# Patient Record
Sex: MALE | NOT HISPANIC OR LATINO | Employment: OTHER | URBAN - METROPOLITAN AREA
[De-identification: names, ages, dates, MRNs, and addresses within clinical notes are randomized per-mention and may not be internally consistent; named-entity substitution may affect disease eponyms.]

---

## 2022-05-25 ENCOUNTER — OFFICE VISIT (OUTPATIENT)
Dept: CARDIOLOGY | Facility: CLINIC | Age: 77
End: 2022-05-25

## 2022-05-25 VITALS
DIASTOLIC BLOOD PRESSURE: 82 MMHG | SYSTOLIC BLOOD PRESSURE: 124 MMHG | BODY MASS INDEX: 28.56 KG/M2 | WEIGHT: 204 LBS | HEIGHT: 71 IN | HEART RATE: 60 BPM | OXYGEN SATURATION: 96 %

## 2022-05-25 DIAGNOSIS — I49.5 SSS (SICK SINUS SYNDROME): ICD-10-CM

## 2022-05-25 DIAGNOSIS — I25.10 CORONARY ARTERY DISEASE INVOLVING NATIVE CORONARY ARTERY OF NATIVE HEART WITHOUT ANGINA PECTORIS: Primary | ICD-10-CM

## 2022-05-25 PROCEDURE — 99204 OFFICE O/P NEW MOD 45 MIN: CPT | Performed by: INTERNAL MEDICINE

## 2022-05-25 PROCEDURE — 93280 PM DEVICE PROGR EVAL DUAL: CPT | Performed by: INTERNAL MEDICINE

## 2022-05-25 PROCEDURE — 93000 ELECTROCARDIOGRAM COMPLETE: CPT | Performed by: INTERNAL MEDICINE

## 2022-05-25 RX ORDER — NITROGLYCERIN 0.4 MG/1
0.4 TABLET SUBLINGUAL
Qty: 25 TABLET | Refills: 3 | Status: SHIPPED | OUTPATIENT
Start: 2022-05-25

## 2022-05-25 RX ORDER — ASPIRIN 81 MG/1
81 TABLET, CHEWABLE ORAL DAILY
COMMUNITY

## 2022-05-25 RX ORDER — NITROGLYCERIN 0.4 MG/1
0.4 TABLET SUBLINGUAL
COMMUNITY
End: 2022-05-25 | Stop reason: SDUPTHER

## 2022-05-25 NOTE — PROGRESS NOTES
[facility] Cardiology  Consultation H&P  Lizandro Culp  1945  [phone]  There is no work phone number on file..    VISIT DATE:  05/25/2022    PCP: Luis Loya  [address] [place] [address]    CC:  Chief Complaint   Patient presents with   • Coronary Artery Disease       Previous cardiac studies and procedures:  October 3 2002: Cardiac catheterization: RCA PCI/stenting  October 4, 2002 LAD stent.  October 15 2002 left circumflex stent    March 2017 LAD stent, left circumflex stent, RCA stent.    April 2017: Stent RCA.    August 2019   Saint Thong dual-chamber pacemaker  Myocardial perfusion imaging: EF 55%, mild inferolateral ischemia.    July 2020 TTE: EF 60%, mild diastolic dysfunction, mitral valve prolapse, posterior leaflet, mild MR.    ASSESSMENT:   Diagnosis Plan   1. Coronary artery disease involving native coronary artery of native heart without angina pectoris     2. SSS (sick sinus syndrome) ([facility])       Device interrogation:  Saint Thong dual-chamber pacemaker  37% atrial pacing, sensed P wave greater than 5 mV, threshold 1 V at 0.4 ms, impedance 4 and 60 ohms  2.8% RV pacing, sensed R wave 10 mV, threshold 2 V at 0.4 ms, impedance 350 ohms  Estimated battery life 9.2 to 11.2 years  Rare atrial lead noise which is responsible for mode switching  1 high ventricular heart rate, brief 2-second episode of SVT.    PLAN:  Coronary artery disease: Currently stable and asymptomatic.  Continue aspirin and heart healthy diet.    Sick sinus syndrome: Continue routine follow-up in device clinic.    Statin intolerant.  Hesitant to try alternative agents.    History of Present Illness   76-year-old gentleman with a history of coronary artery disease requiring recurrent multivessel PCI.  Has been stable recently.  Has stable shortness of breath in a class II pattern.  Denies chest discomfort.  Blood pressures running less than 130/80 mmHg.  Has not required  "any sublingual nitroglycerin recently.  Taking aspirin on a regular basis.  Developed flulike symptoms on multiple statins agents.  Discussed potential options such as PCSK9 inhibitors, he is not interested in trying an alternative agent at this time.  Reviewed most recent laboratory evaluation.    PHYSICAL EXAMINATION:  Vitals:    05/25/22 1025   BP: 124/82   BP Location: Right arm   Patient Position: Sitting   Pulse: 60   SpO2: 96%   Weight: 92.5 kg (204 lb)   Height: 180.3 cm (71\")     General Appearance:    Alert, cooperative, no distress, appears stated age   Head:    Normocephalic, without obvious abnormality, atraumatic   Eyes:    conjunctiva/corneas clear, EOM's intact, fundi     benign, both eyes   Ears:    Normal TM's and external ear canals, both ears   Nose:   Nares normal, septum midline, mucosa normal, no drainage    or sinus tenderness   Throat:   Lips, mucosa, and tongue normal; teeth and gums normal   Neck:   Supple, symmetrical, trachea midline, no adenopathy;     thyroid:  no enlargement/tenderness/nodules; no carotid    bruit or JVD   Back:     Symmetric, no curvature, ROM normal, no CVA tenderness   Lungs:     Clear to auscultation bilaterally, respirations unlabored   Chest Wall:    No tenderness or deformity    Heart:    Regular rate and rhythm, S1 and S2 normal, no murmur, rub   or gallop, normal carotid impulse bilaterally without bruit.   Abdomen:     Soft, non-tender, bowel sounds active all four quadrants,     no masses, no organomegaly   Extremities:   Extremities normal, atraumatic, no cyanosis or edema   Pulses:   2+ and symmetric all extremities   Skin:   Skin color, texture, turgor normal, no rashes or lesions   Lymph nodes:   Cervical, supraclavicular, and axillary nodes normal   Neurologic:   normal strength, sensation intact     throughout       Diagnostic Data:    ECG 12 Lead    Date/Time: 5/25/2022 10:34 AM  Performed by: Ren Sneed III, MD  Authorized by: Ren Sneed III, " MD   Previous ECG: no previous ECG available  Rhythm: sinus rhythm  Rhythm comments: Atrial demand pacing  Other findings: T wave abnormality    Clinical impression: abnormal EKG          No results found for: CHLPL, TRIG, HDL, LDLDIRECT  No results found for: GLUCOSE, BUN, CREATININE, NA, K, CL, CO2, CREATININE, BUN  No results found for: HGBA1C  No results found for: WBC, HGB, HCT, PLT    PROBLEM LIST:  There is no problem list on file for this patient.      PAST MEDICAL HX  Past Medical History:   Diagnosis Date   • Thyroid disease        Allergies  No Known Allergies    Current Medications    Current Outpatient Medications:   •  nitroglycerin (NITROSTAT) 0.4 MG SL tablet, Place 0.4 mg under the tongue Every 5 (Five) Minutes As Needed for Chest Pain. Take no more than 3 doses in 15 minutes., Disp: , Rfl:          ROS  ROS      SOCIAL HX  Social History     Socioeconomic History   • Marital status:    Tobacco Use   • Smoking status: Former Smoker     Years: 25.00     Types: Cigarettes   Substance and Sexual Activity   • Alcohol use: Yes   • Drug use: Never   • Sexual activity: Defer       FAMILY HX  History reviewed. No pertinent family history.          Ren Sneed III, MD, FACC

## 2022-06-09 ENCOUNTER — OFFICE VISIT (OUTPATIENT)
Dept: GASTROENTEROLOGY | Facility: CLINIC | Age: 77
End: 2022-06-09

## 2022-06-09 VITALS
HEIGHT: 71 IN | DIASTOLIC BLOOD PRESSURE: 77 MMHG | TEMPERATURE: 98 F | BODY MASS INDEX: 28.98 KG/M2 | HEART RATE: 64 BPM | SYSTOLIC BLOOD PRESSURE: 125 MMHG | WEIGHT: 207 LBS | RESPIRATION RATE: 24 BRPM

## 2022-06-09 DIAGNOSIS — Z12.11 ENCOUNTER FOR SCREENING FOR MALIGNANT NEOPLASM OF COLON: Primary | ICD-10-CM

## 2022-06-09 DIAGNOSIS — Z01.812 PRE-PROCEDURE LAB EXAM: Primary | ICD-10-CM

## 2022-06-09 DIAGNOSIS — K21.9 GASTROESOPHAGEAL REFLUX DISEASE WITHOUT ESOPHAGITIS: ICD-10-CM

## 2022-06-09 PROCEDURE — 99203 OFFICE O/P NEW LOW 30 MIN: CPT | Performed by: INTERNAL MEDICINE

## 2022-06-09 RX ORDER — ZINC OXIDE 13 %
CREAM (GRAM) TOPICAL
COMMUNITY

## 2022-06-09 RX ORDER — POLYETHYLENE GLYCOL 3350 17 G/17G
238 POWDER, FOR SOLUTION ORAL ONCE
Qty: 238 G | Refills: 0 | Status: SHIPPED | OUTPATIENT
Start: 2022-06-09 | End: 2022-06-09

## 2022-06-09 RX ORDER — BISACODYL 5 MG/1
20 TABLET, DELAYED RELEASE ORAL ONCE
Qty: 4 TABLET | Refills: 0 | Status: SHIPPED | OUTPATIENT
Start: 2022-06-09 | End: 2022-06-09

## 2022-06-09 RX ORDER — SODIUM CHLORIDE 9 MG/ML
70 INJECTION, SOLUTION INTRAVENOUS CONTINUOUS PRN
Status: CANCELLED | OUTPATIENT
Start: 2022-06-09

## 2022-06-09 NOTE — PROGRESS NOTES
New Patient Consult      Date: 2022   Patient Name: Lizandro Culp  MRN: [id number]  : 1945     Referring Physician: Luis Loya*    Chief Complaint   Patient presents with   • Colon Cancer Screening       History of Present Illness: Lizandro Culp is a 76 y.o. male who is here today to establish care with Gastroenterology for evalauation for colon cancer screening and reflux disease..   His family moved from [place] to [place] recently and is establishing GI care here.    Patient deny any abdominal pain, change in bowel habit, hematochezia or melena.  Weight is stable. Pt denies nausea vomiting or odynophagia or dysphagia. There is a longstanding history of acid reflux over 30 years and has been on Nexium over ten yrs but now he is on on digestive enzymes as advised by his nutritionist with no reflux symptoms. There is no history of anemia. No Prior history of EGD over 30 yrs ago. No details known except had hiatal hernia and was told he had scar tissues. Last colonoscopy over ten years ago, details not known. No family history of colon cancer or any GI malignancy. No history of any abdominal surgery. Denies cigarette smoking.  He is a former smoker.  Smoked for over 25 years. Drinks alcohol socially.    History of carotid disease and had a stent placed in .  He also had a pacemaker inserted and was removed in .  On ASA 81 daily  Blood work done in 2022 reviewed,  CBC and CMP were normal.    Subjective      Past Medical History:   Past Medical History:   Diagnosis Date   • Heart attack (HCC)    • Hiatal hernia    • Snores    • Thyroid disease        Past Surgical History:   Past Surgical History:   Procedure Laterality Date   • BACK SURGERY      fusion/deiscectomy   • COLONOSCOPY     • CORONARY ANGIOPLASTY        6 stents   • CORONARY ANGIOPLASTY WITH STENT PLACEMENT  2017    x 3 stent   • INGUINAL HERNIA REPAIR Bilateral    • INSERT / REPLACE / REMOVE PACEMAKER          Family History:   Family History   Problem Relation Age of Onset   • Colon cancer Neg Hx        Social History:   Social History     Socioeconomic History   • Marital status:    Tobacco Use   • Smoking status: Former Smoker     Years: 25.00     Types: Cigarettes     Quit date:      Years since quittin.4   • Smokeless tobacco: Never Used   Vaping Use   • Vaping Use: Never used   Substance and Sexual Activity   • Alcohol use: Yes     Comment: social   • Drug use: Never   • Sexual activity: Defer         Current Outpatient Medications:   •  Apoaequorin (PREVAGEN PO), Take  by mouth., Disp: , Rfl:   •  Ascorbic Acid (VITAMIN C PO), Take  by mouth., Disp: , Rfl:   •  aspirin 81 MG chewable tablet, Chew 81 mg Daily., Disp: , Rfl:   •  Cholecalciferol (VITAMIN D3 PO), Take  by mouth., Disp: , Rfl:   •  DIGESTIVE ENZYMES PO, Take  by mouth., Disp: , Rfl:   •  Misc Natural Products (PROSTATE SUPPORT PO), Take  by mouth. Super Beta Prostate, Disp: , Rfl:   •  nitroglycerin (NITROSTAT) 0.4 MG SL tablet, Place 1 tablet under the tongue Every 5 (Five) Minutes As Needed for Chest Pain. Take no more than 3 doses in 15 minutes., Disp: 25 tablet, Rfl: 3  •  Probiotic Product (Probiotic Daily) capsule, Take  by mouth., Disp: , Rfl:   •  Saw Palmetto, Serenoa repens, (SAW PALMETTO PO), Take  by mouth., Disp: , Rfl:     Allergies   Allergen Reactions   • Oatmeal Other (See Comments)     Foggy feeling, swollen glands, myalgia       Review of Systems:   Review of Systems   Constitutional: Negative for appetite change, fatigue, fever and unexpected weight loss.   HENT: Negative for trouble swallowing.    Gastrointestinal: Positive for GERD. Negative for abdominal distention, abdominal pain, anal bleeding, blood in stool, constipation, diarrhea, nausea, rectal pain, vomiting and indigestion.       The following portions of the patient's history were reviewed and updated as appropriate: allergies, current medications,  "past family history, past medical history, past social history, past surgical history and problem list.    Objective     Physical Exam:  Vital Signs:   Vitals:    06/09/22 1522   BP: 125/77   Pulse: 64   Resp: 24   Temp: 98 °F (36.7 °C)   TempSrc: Infrared   Weight: 93.9 kg (207 lb)   Height: 180.3 cm (71\")       Physical Exam  Constitutional:       Appearance: Normal appearance.   HENT:      Head: Normocephalic and atraumatic.   Eyes:      Conjunctiva/sclera: Conjunctivae normal.   Abdominal:      General: Abdomen is flat. There is no distension.      Palpations: There is no mass.      Tenderness: There is no abdominal tenderness. There is no guarding or rebound.      Hernia: No hernia is present.   Musculoskeletal:      Cervical back: Normal range of motion and neck supple.   Neurological:      Mental Status: He is alert.           Results Review:   I have reviewed the patient's new clinical and imaging results and agree with the interpretation.     No results found for any previous visit.      No radiology results for the last 90 days.    Assessment / Plan      Assessment & Plan:  1. Encounter for screening for malignant neoplasm of colon  No current lower GI symptoms.  No family history of any colon cancer.  As per patient he had a colonoscopy done over 10 years ago and was been told normal details unknown.  He is overdue for his colonoscopy and will schedule the same.  His lab work done CMP CBC in April 2022 reviewed.  Hemoglobin was normal.  Due to history of prior stent we will continue his aspirin julia procedure.    The indications, technique, alternatives and potential risk and complications were discussed with the patient including but not limited to bleeding, bowel perforations, missing lesions and anesthetic complications. The patient understands and wishes to proceed with the procedure and has given their verbal consent. Written patient education information was given to the patient.   The patient will " call if they have further questions before procedure.     2. Gastroesophageal reflux disease without esophagitis  He has a longstanding history of gastroesophageal reflux disease since he was a teenager.  He was on a PPI for many years and had Nexium alone for over 10 years that was stopped last few years.  He is currently on digestive enzymes as advised by his nutritionist without any reflux symptoms as per patient.  His last EGD was for 30 years ago.    Given his longstanding reflux disease and long history of PPI use, we will schedule him for an EGD to rule out Souza's esophagus.  Reflux diet discussed with the patient.  We will recommend further based on the endoscopy findings.      Follow Up:   No follow-ups on file.    Kelsie Vora MD  [facility]  6/9/2022  15:45 EDT    Please note that portions of this note may have been completed with a voice recognition program.

## 2022-06-20 PROBLEM — Z12.11 ENCOUNTER FOR SCREENING FOR MALIGNANT NEOPLASM OF COLON: Status: ACTIVE | Noted: 2022-06-20

## 2022-06-20 PROBLEM — K21.9 GASTROESOPHAGEAL REFLUX DISEASE WITHOUT ESOPHAGITIS: Status: ACTIVE | Noted: 2022-06-20

## 2022-08-15 ENCOUNTER — TELEPHONE (OUTPATIENT)
Dept: GASTROENTEROLOGY | Facility: CLINIC | Age: 77
End: 2022-08-15

## 2022-12-05 ENCOUNTER — OFFICE VISIT (OUTPATIENT)
Dept: CARDIOLOGY | Facility: CLINIC | Age: 77
End: 2022-12-05

## 2022-12-05 VITALS
SYSTOLIC BLOOD PRESSURE: 120 MMHG | BODY MASS INDEX: 28.7 KG/M2 | OXYGEN SATURATION: 97 % | DIASTOLIC BLOOD PRESSURE: 78 MMHG | HEIGHT: 71 IN | HEART RATE: 69 BPM | WEIGHT: 205 LBS

## 2022-12-05 DIAGNOSIS — I25.810 CORONARY ARTERY DISEASE INVOLVING CORONARY BYPASS GRAFT OF NATIVE HEART WITHOUT ANGINA PECTORIS: Primary | ICD-10-CM

## 2022-12-05 DIAGNOSIS — I49.5 SSS (SICK SINUS SYNDROME): ICD-10-CM

## 2022-12-05 PROCEDURE — 99214 OFFICE O/P EST MOD 30 MIN: CPT | Performed by: INTERNAL MEDICINE

## 2022-12-05 PROCEDURE — 93280 PM DEVICE PROGR EVAL DUAL: CPT | Performed by: INTERNAL MEDICINE

## 2022-12-05 NOTE — PROGRESS NOTES
[facility] Cardiology  Office visit  Lizandro Culp  1945  [phone]  There is no work phone number on file.    VISIT DATE:  12/5/2022    PCP: Luis Loya  [address] [place] [address]    CC:  Chief Complaint   Patient presents with   • Coronary artery disease involving native coronary artery of       Previous cardiac studies and procedures:  October 3 2002: Cardiac catheterization: RCA PCI/stenting  October 4, 2002 LAD stent.  October 15 2002 left circumflex stent     March 2017 LAD stent, left circumflex stent, RCA stent.     April 2017: Stent RCA.     August 2019   Saint Thong dual-chamber pacemaker  Myocardial perfusion imaging: EF 55%, mild inferolateral ischemia.     July 2020 TTE: EF 60%, mild diastolic dysfunction, mitral valve prolapse, posterior leaflet, mild MR.    ASSESSMENT:   Diagnosis Plan   1. Coronary artery disease involving coronary bypass graft of native heart without angina pectoris        2. SSS (sick sinus syndrome) ([facility])            Device interrogation:  Saint Thong dual-chamber pacemaker  37% atrial pacing, sensed P wave greater than 5 mV, threshold 0.5 V at 0.4 ms, impedance 390 ohms  3 % RV pacing, sensed R wave 9.5 mV, threshold 1.6 to V at 0.4 ms, impedance 340 ohms  Estimated battery life 7.7 years  Rare atrial lead noise which is responsible for mode switching  No significant arrhythmia.    PLAN:  Coronary artery disease: Currently stable and asymptomatic.  Continue aspirin and heart healthy diet.     Sick sinus syndrome: Continue routine follow-up in device clinic.     Statin intolerant.  Hesitant to try alternative agents.    Subjective  Interval assessment: No change in baseline functional capacity.  Blood pressures running less than 130/80 mmHg.  Denies chest pain or dyspnea.  Compliant with medical therapy.    Initial evaluation: 76-year-old gentleman with a history of coronary artery disease requiring recurrent  "multivessel PCI.  Has been stable recently.  Has stable shortness of breath in a class II pattern.  Denies chest discomfort.  Blood pressures running less than 130/80 mmHg.  Has not required any sublingual nitroglycerin recently.  Taking aspirin on a regular basis.  Developed flulike symptoms on multiple statins agents.  Discussed potential options such as PCSK9 inhibitors, he is not interested in trying an alternative agent at this time.  Reviewed most recent laboratory evaluation.    PHYSICAL EXAMINATION:  Vitals:    12/05/22 1150   BP: 120/78   BP Location: Left arm   Patient Position: Sitting   Pulse: 69   SpO2: 97%   Weight: 93 kg (205 lb)   Height: 180.3 cm (71\")     General Appearance:    Alert, cooperative, no distress, appears stated age   Head:    Normocephalic, without obvious abnormality, atraumatic   Eyes:    conjunctiva/corneas clear   Nose:   Nares normal, septum midline, mucosa normal, no drainage   Throat:   Lips, teeth and gums normal   Neck:   Supple, symmetrical, trachea midline, no carotid    bruit or JVD   Lungs:     Clear to auscultation bilaterally, respirations unlabored   Chest Wall:    No tenderness or deformity    Heart:    Regular rate and rhythm, S1 and S2 normal, no murmur, rub   or gallop, normal carotid impulse bilaterally without bruit.   Abdomen:     Soft, non-tender   Extremities:   Extremities normal, atraumatic, no cyanosis or edema   Pulses:   2+ and symmetric all extremities   Skin:   Skin color, texture, turgor normal, no rashes or lesions       Diagnostic Data:  Procedures  No results found for: CHLPL, TRIG, HDL, LDLDIRECT  No results found for: GLUCOSE, BUN, CREATININE, NA, K, CL, CO2, CREATININE, BUN  No results found for: HGBA1C  No results found for: WBC, HGB, HCT, PLT    Allergies  Allergies   Allergen Reactions   • Lipitor [Atorvastatin] Provider Review Needed   • Oatmeal Other (See Comments)     Foggy feeling, swollen glands, myalgia       Current " Medications    Current Outpatient Medications:   •  Apoaequorin (PREVAGEN PO), Take  by mouth., Disp: , Rfl:   •  Ascorbic Acid (VITAMIN C PO), Take  by mouth., Disp: , Rfl:   •  aspirin 81 MG chewable tablet, Chew 81 mg Daily., Disp: , Rfl:   •  Cholecalciferol (VITAMIN D3 PO), Take  by mouth., Disp: , Rfl:   •  DIGESTIVE ENZYMES PO, Take  by mouth., Disp: , Rfl:   •  Misc Natural Products (PROSTATE SUPPORT PO), Take  by mouth. Super Beta Prostate, Disp: , Rfl:   •  nitroglycerin (NITROSTAT) 0.4 MG SL tablet, Place 1 tablet under the tongue Every 5 (Five) Minutes As Needed for Chest Pain. Take no more than 3 doses in 15 minutes., Disp: 25 tablet, Rfl: 3  •  Probiotic Product (Probiotic Daily) capsule, Take  by mouth., Disp: , Rfl:   •  Saw Palmetto, Serenoa repens, (SAW PALMETTO PO), Take  by mouth., Disp: , Rfl:           ROS  ROS      SOCIAL HX  Social History     Socioeconomic History   • Marital status:    Tobacco Use   • Smoking status: Former     Years: 25.00     Types: Cigarettes     Quit date:      Years since quittin.9   • Smokeless tobacco: Never   Vaping Use   • Vaping Use: Never used   Substance and Sexual Activity   • Alcohol use: Yes     Comment: social   • Drug use: Never   • Sexual activity: Defer       FAMILY HX  Family History   Problem Relation Age of Onset   • Stroke Other    • Colon cancer Neg Hx              Ren Sneed III, MD, FACC

## 2022-12-08 ENCOUNTER — TELEPHONE (OUTPATIENT)
Dept: FAMILY MEDICINE CLINIC | Facility: CLINIC | Age: 77
End: 2022-12-08

## 2022-12-08 NOTE — TELEPHONE ENCOUNTER
DR HERRON REFERRED PATIENT TO [facility] AND THEY WOULD LIKE TO KNOW THE NAME OF THE DOCTOR AGAIN.  UNABLE TO LOCATE IN REFERRALS.     PLEASE CALL [phone]  
06-Dec-2018

## 2023-01-08 PROCEDURE — 93296 REM INTERROG EVL PM/IDS: CPT | Performed by: INTERNAL MEDICINE

## 2023-01-08 PROCEDURE — 93294 REM INTERROG EVL PM/LDLS PM: CPT | Performed by: INTERNAL MEDICINE

## 2023-04-09 PROCEDURE — 93296 REM INTERROG EVL PM/IDS: CPT | Performed by: INTERNAL MEDICINE

## 2023-04-09 PROCEDURE — 93294 REM INTERROG EVL PM/LDLS PM: CPT | Performed by: INTERNAL MEDICINE

## 2023-05-30 ENCOUNTER — TELEPHONE (OUTPATIENT)
Dept: CARDIOLOGY | Facility: CLINIC | Age: 78
End: 2023-05-30

## 2023-05-30 DIAGNOSIS — I20.0 UNSTABLE ANGINA: ICD-10-CM

## 2023-05-30 DIAGNOSIS — I25.810 CORONARY ARTERY DISEASE INVOLVING CORONARY BYPASS GRAFT OF NATIVE HEART WITHOUT ANGINA PECTORIS: Primary | ICD-10-CM

## 2023-05-30 NOTE — TELEPHONE ENCOUNTER
Had chest pain on Saturday. Mid-sternal. Did not radiate. SOA on exertion.Took 3 NTG before it was relieved. Has a strong cardiac Hx. Requesting a Cardiac Cath be ordered? Please advise.

## 2023-05-30 NOTE — TELEPHONE ENCOUNTER
Called patient to report message above from . Unavailable.Spoke to spouse. Notified of message above from . Verbalized understanding.

## 2023-06-02 ENCOUNTER — APPOINTMENT (OUTPATIENT)
Dept: GENERAL RADIOLOGY | Facility: HOSPITAL | Age: 78
End: 2023-06-02
Payer: MEDICARE

## 2023-06-02 ENCOUNTER — HOSPITAL ENCOUNTER (OUTPATIENT)
Facility: HOSPITAL | Age: 78
Setting detail: HOSPITAL OUTPATIENT SURGERY
Discharge: HOME | End: 2023-06-02
Attending: INTERNAL MEDICINE | Admitting: INTERNAL MEDICINE
Payer: MEDICARE

## 2023-06-02 VITALS
HEART RATE: 60 BPM | TEMPERATURE: 97 F | BODY MASS INDEX: 28.5 KG/M2 | SYSTOLIC BLOOD PRESSURE: 146 MMHG | DIASTOLIC BLOOD PRESSURE: 91 MMHG | OXYGEN SATURATION: 95 % | HEIGHT: 71 IN | RESPIRATION RATE: 18 BRPM | WEIGHT: 203.6 LBS

## 2023-06-02 DIAGNOSIS — I20.0 UNSTABLE ANGINA: ICD-10-CM

## 2023-06-02 DIAGNOSIS — I25.810 CORONARY ARTERY DISEASE INVOLVING CORONARY BYPASS GRAFT OF NATIVE HEART WITHOUT ANGINA PECTORIS: ICD-10-CM

## 2023-06-02 LAB
ALBUMIN SERPL-MCNC: 4 G/DL (ref 3.5–5.2)
ALBUMIN/GLOB SERPL: 1.3 G/DL
ALP SERPL-CCNC: 74 U/L (ref 39–117)
ALT SERPL W P-5'-P-CCNC: 18 U/L (ref 1–41)
ANION GAP SERPL CALCULATED.3IONS-SCNC: 8 MMOL/L (ref 5–15)
AST SERPL-CCNC: 19 U/L (ref 1–40)
BILIRUB SERPL-MCNC: 0.5 MG/DL (ref 0–1.2)
BUN SERPL-MCNC: 19 MG/DL (ref 8–23)
BUN/CREAT SERPL: 20 (ref 7–25)
CALCIUM SPEC-SCNC: 8.9 MG/DL (ref 8.6–10.5)
CATH EF ESTIMATED: 60 %
CHLORIDE SERPL-SCNC: 107 MMOL/L (ref 98–107)
CHOLEST SERPL-MCNC: 204 MG/DL (ref 0–200)
CO2 SERPL-SCNC: 23 MMOL/L (ref 22–29)
CREAT BLDA-MCNC: 1 MG/DL (ref 0.6–1.3)
CREAT SERPL-MCNC: 0.95 MG/DL (ref 0.76–1.27)
DEPRECATED RDW RBC AUTO: 47.8 FL (ref 37–54)
EGFRCR SERPLBLD CKD-EPI 2021: 83 ML/MIN/1.73
ERYTHROCYTE [DISTWIDTH] IN BLOOD BY AUTOMATED COUNT: 13.3 % (ref 12.3–15.4)
GLOBULIN UR ELPH-MCNC: 3.2 GM/DL
GLUCOSE SERPL-MCNC: 107 MG/DL (ref 65–99)
HBA1C MFR BLD: 6.2 % (ref 4.8–5.6)
HCT VFR BLD AUTO: 44.2 % (ref 37.5–51)
HDLC SERPL-MCNC: 56 MG/DL (ref 40–60)
HGB BLD-MCNC: 14.5 G/DL (ref 13–17.7)
LDLC SERPL CALC-MCNC: 139 MG/DL (ref 0–100)
LDLC/HDLC SERPL: 2.47 {RATIO}
MCH RBC QN AUTO: 31.8 PG (ref 26.6–33)
MCHC RBC AUTO-ENTMCNC: 32.8 G/DL (ref 31.5–35.7)
MCV RBC AUTO: 96.9 FL (ref 79–97)
PLATELET # BLD AUTO: 166 10*3/MM3 (ref 140–450)
PMV BLD AUTO: 11.2 FL (ref 6–12)
POTASSIUM SERPL-SCNC: 4.2 MMOL/L (ref 3.5–5.2)
PROT SERPL-MCNC: 7.2 G/DL (ref 6–8.5)
RBC # BLD AUTO: 4.56 10*6/MM3 (ref 4.14–5.8)
SODIUM SERPL-SCNC: 138 MMOL/L (ref 136–145)
TRIGL SERPL-MCNC: 49 MG/DL (ref 0–150)
VLDLC SERPL-MCNC: 9 MG/DL (ref 5–40)
WBC NRBC COR # BLD: 5.38 10*3/MM3 (ref 3.4–10.8)

## 2023-06-02 PROCEDURE — 92978 ENDOLUMINL IVUS OCT C 1ST: CPT | Performed by: INTERNAL MEDICINE

## 2023-06-02 PROCEDURE — 25010000002 HEPARIN (PORCINE) PER 1000 UNITS: Performed by: INTERNAL MEDICINE

## 2023-06-02 PROCEDURE — 83036 HEMOGLOBIN GLYCOSYLATED A1C: CPT | Performed by: NURSE PRACTITIONER

## 2023-06-02 PROCEDURE — 85027 COMPLETE CBC AUTOMATED: CPT | Performed by: NURSE PRACTITIONER

## 2023-06-02 PROCEDURE — 82565 ASSAY OF CREATININE: CPT

## 2023-06-02 PROCEDURE — C1887 CATHETER, GUIDING: HCPCS | Performed by: INTERNAL MEDICINE

## 2023-06-02 PROCEDURE — 0 LIDOCAINE 1 % SOLUTION: Performed by: INTERNAL MEDICINE

## 2023-06-02 PROCEDURE — C1769 GUIDE WIRE: HCPCS | Performed by: INTERNAL MEDICINE

## 2023-06-02 PROCEDURE — 25010000002 BIVALIRUDIN TRIFLUOROACETATE 250 MG RECONSTITUTED SOLUTION 1 EACH VIAL: Performed by: INTERNAL MEDICINE

## 2023-06-02 PROCEDURE — C1725 CATH, TRANSLUMIN NON-LASER: HCPCS | Performed by: INTERNAL MEDICINE

## 2023-06-02 PROCEDURE — 71045 X-RAY EXAM CHEST 1 VIEW: CPT

## 2023-06-02 PROCEDURE — C1874 STENT, COATED/COV W/DEL SYS: HCPCS | Performed by: INTERNAL MEDICINE

## 2023-06-02 PROCEDURE — 80061 LIPID PANEL: CPT | Performed by: NURSE PRACTITIONER

## 2023-06-02 PROCEDURE — C1894 INTRO/SHEATH, NON-LASER: HCPCS | Performed by: INTERNAL MEDICINE

## 2023-06-02 PROCEDURE — 93458 L HRT ARTERY/VENTRICLE ANGIO: CPT | Performed by: INTERNAL MEDICINE

## 2023-06-02 PROCEDURE — C9600 PERC DRUG-EL COR STENT SING: HCPCS | Performed by: INTERNAL MEDICINE

## 2023-06-02 PROCEDURE — 25010000002 FENTANYL CITRATE (PF) 50 MCG/ML SOLUTION: Performed by: INTERNAL MEDICINE

## 2023-06-02 PROCEDURE — C1753 CATH, INTRAVAS ULTRASOUND: HCPCS | Performed by: INTERNAL MEDICINE

## 2023-06-02 PROCEDURE — 25010000002 MIDAZOLAM PER 1 MG: Performed by: INTERNAL MEDICINE

## 2023-06-02 PROCEDURE — 80053 COMPREHEN METABOLIC PANEL: CPT | Performed by: NURSE PRACTITIONER

## 2023-06-02 PROCEDURE — 25510000001 IOPAMIDOL PER 1 ML: Performed by: INTERNAL MEDICINE

## 2023-06-02 DEVICE — XIENCE SKYPOINT™ EVEROLIMUS ELUTING CORONARY STENT SYSTEM 3.50 MM X 18 MM / RAPID-EXCHANGE
Type: IMPLANTABLE DEVICE | Status: FUNCTIONAL
Brand: XIENCE SKYPOINT™

## 2023-06-02 DEVICE — XIENCE SKYPOINT™ EVEROLIMUS ELUTING CORONARY STENT SYSTEM 4.00 MM X 15 MM / RAPID-EXCHANGE
Type: IMPLANTABLE DEVICE | Status: FUNCTIONAL
Brand: XIENCE SKYPOINT™

## 2023-06-02 RX ORDER — FENTANYL CITRATE 50 UG/ML
INJECTION, SOLUTION INTRAMUSCULAR; INTRAVENOUS
Status: DISCONTINUED | OUTPATIENT
Start: 2023-06-02 | End: 2023-06-02 | Stop reason: HOSPADM

## 2023-06-02 RX ORDER — ASPIRIN 325 MG
325 TABLET, DELAYED RELEASE (ENTERIC COATED) ORAL DAILY
Status: DISCONTINUED | OUTPATIENT
Start: 2023-06-03 | End: 2023-06-02 | Stop reason: HOSPADM

## 2023-06-02 RX ORDER — SODIUM CHLORIDE 9 MG/ML
3 INJECTION, SOLUTION INTRAVENOUS CONTINUOUS
Status: ACTIVE | OUTPATIENT
Start: 2023-06-02 | End: 2023-06-02

## 2023-06-02 RX ORDER — NITROGLYCERIN 0.4 MG/1
0.4 TABLET SUBLINGUAL
Status: DISCONTINUED | OUTPATIENT
Start: 2023-06-02 | End: 2023-06-02 | Stop reason: HOSPADM

## 2023-06-02 RX ORDER — SODIUM CHLORIDE 0.9 % (FLUSH) 0.9 %
10 SYRINGE (ML) INJECTION EVERY 12 HOURS SCHEDULED
Status: DISCONTINUED | OUTPATIENT
Start: 2023-06-02 | End: 2023-06-02 | Stop reason: HOSPADM

## 2023-06-02 RX ORDER — MIDAZOLAM HYDROCHLORIDE 1 MG/ML
INJECTION INTRAMUSCULAR; INTRAVENOUS
Status: DISCONTINUED | OUTPATIENT
Start: 2023-06-02 | End: 2023-06-02 | Stop reason: HOSPADM

## 2023-06-02 RX ORDER — SODIUM CHLORIDE 0.9 % (FLUSH) 0.9 %
1-10 SYRINGE (ML) INJECTION AS NEEDED
Status: DISCONTINUED | OUTPATIENT
Start: 2023-06-02 | End: 2023-06-02 | Stop reason: HOSPADM

## 2023-06-02 RX ORDER — CLOPIDOGREL BISULFATE 75 MG/1
75 TABLET ORAL DAILY
Qty: 30 TABLET | Refills: 11 | Status: SHIPPED | OUTPATIENT
Start: 2023-06-02

## 2023-06-02 RX ORDER — SODIUM CHLORIDE 9 MG/ML
40 INJECTION, SOLUTION INTRAVENOUS AS NEEDED
Status: DISCONTINUED | OUTPATIENT
Start: 2023-06-02 | End: 2023-06-02 | Stop reason: HOSPADM

## 2023-06-02 RX ORDER — ONDANSETRON 2 MG/ML
4 INJECTION INTRAMUSCULAR; INTRAVENOUS EVERY 6 HOURS PRN
Status: DISCONTINUED | OUTPATIENT
Start: 2023-06-02 | End: 2023-06-02 | Stop reason: HOSPADM

## 2023-06-02 RX ORDER — HEPARIN SODIUM 1000 [USP'U]/ML
INJECTION, SOLUTION INTRAVENOUS; SUBCUTANEOUS
Status: DISCONTINUED | OUTPATIENT
Start: 2023-06-02 | End: 2023-06-02 | Stop reason: HOSPADM

## 2023-06-02 RX ORDER — NICARDIPINE HCL-0.9% SOD CHLOR 1 MG/10 ML
SYRINGE (ML) INTRAVENOUS
Status: DISCONTINUED | OUTPATIENT
Start: 2023-06-02 | End: 2023-06-02 | Stop reason: HOSPADM

## 2023-06-02 RX ORDER — ACETAMINOPHEN 325 MG/1
650 TABLET ORAL EVERY 4 HOURS PRN
Status: DISCONTINUED | OUTPATIENT
Start: 2023-06-02 | End: 2023-06-02 | Stop reason: HOSPADM

## 2023-06-02 RX ORDER — ASPIRIN 325 MG
325 TABLET ORAL ONCE
Status: COMPLETED | OUTPATIENT
Start: 2023-06-02 | End: 2023-06-02

## 2023-06-02 RX ORDER — LIDOCAINE HYDROCHLORIDE 10 MG/ML
INJECTION, SOLUTION INFILTRATION; PERINEURAL
Status: DISCONTINUED | OUTPATIENT
Start: 2023-06-02 | End: 2023-06-02 | Stop reason: HOSPADM

## 2023-06-02 RX ORDER — CLOPIDOGREL BISULFATE 75 MG/1
TABLET ORAL
Status: DISCONTINUED | OUTPATIENT
Start: 2023-06-02 | End: 2023-06-02 | Stop reason: HOSPADM

## 2023-06-02 RX ADMIN — SODIUM CHLORIDE 3 ML/KG/HR: 9 INJECTION, SOLUTION INTRAVENOUS at 07:35

## 2023-06-02 RX ADMIN — ASPIRIN 325 MG: 325 TABLET ORAL at 07:35

## 2023-06-02 NOTE — Clinical Note
First balloon inflation max pressure = 12 gisel. First balloon inflation duration = 15 seconds. Second inflation of balloon - Max pressure = 12 gisel. 2nd Inflation of balloon - Duration = 15 seconds. Third inflation of balloon - Max pressure = 14 gisel. 3rd Inflation of balloon - Duration = 15 seconds.

## 2023-06-02 NOTE — Clinical Note
Prepped: right groin and Right Wrist. Prepped with: ChloraPrep. The site was clipped. The patient was draped in a sterile fashion. TOKDAYNA:'[id number]'

## 2023-06-02 NOTE — Clinical Note
First balloon inflation max pressure = 12 gisel. First balloon inflation duration = 15 seconds. Second inflation of balloon - Max pressure = 12 gisel. 2nd Inflation of balloon - Duration = 15 seconds.

## 2023-06-02 NOTE — Clinical Note
First balloon inflation max pressure = 12 gisel. First balloon inflation duration = 15 seconds. Second inflation of balloon - Max pressure = 12 gisel. 2nd Inflation of balloon - Duration = 15 seconds. Third inflation of balloon - Max pressure = 12 gisel. 3rd Inflation of balloon - Duration = 15 seconds.

## 2023-06-02 NOTE — H&P
[facility] at [facility]   History and physical    Referring Provider: Dr. Sneed      Patient Care Team:  Luis Loya MD as PCP - General (Family Medicine)  Ren Sneed III, MD as Cardiologist (Cardiology)    Past Medical History:   Diagnosis Date   • Bradycardia     s/p pacemaker   • CAD (coronary artery disease)    • Hearing difficulty    • Heart attack 2017   • Hiatal hernia    • Hyperthyroidism    • Orthostatic hypotension    • Refusal of statin medication by patient    • Refused pneumococcal vaccination    • Snores    • Trigger finger     Left thumb       Past Surgical History:   Procedure Laterality Date   • BACK SURGERY      fusion/deiscectomy   • CARDIAC CATHETERIZATION      06/02/2023 PER DR. LAWRENCE   • COLONOSCOPY     • CORONARY ANGIOPLASTY  2003    x6 stents   • CORONARY ANGIOPLASTY WITH STENT PLACEMENT  2017    x 3 stent   • INGUINAL HERNIA REPAIR Bilateral    • INSERT / REPLACE / REMOVE PACEMAKER  2020         Allergies   Allergen Reactions   • Lipitor [Atorvastatin] Provider Review Needed     Flu like symptoms   • Oatmeal Other (See Comments)     Foggy feeling, swollen glands, myalgia           Current Facility-Administered Medications:   •  [COMPLETED] aspirin tablet 325 mg, 325 mg, Oral, Once, 325 mg at 06/02/23 0735 **AND** [START ON 6/3/2023] aspirin EC tablet 325 mg, 325 mg, Oral, Daily, Benedicter Corinne, APRN  •  nitroglycerin (NITROSTAT) SL tablet 0.4 mg, 0.4 mg, Sublingual, Q5 Min PRN, oCrinne Arellano, APRN  •  ondansetron (ZOFRAN) injection 4 mg, 4 mg, Intravenous, Q6H PRN, BenedicterGerardole, APRN  •  sodium chloride 0.9 % flush 1-10 mL, 1-10 mL, Intravenous, PRN, Benedicter Corinne, APRN  •  sodium chloride 0.9 % flush 10 mL, 10 mL, Intravenous, Q12H, Rdvinger Corinne, APRN  •  sodium chloride 0.9 % infusion 40 mL, 40 mL, Intravenous, PRN, Gerardo Arellanole, APRN  •  sodium chloride 0.9 % infusion, 3 mL/kg/hr, Intravenous, Continuous, Nain Lawrence MD,  Last Rate: 277.2 mL/hr at 06/02/23 0735, 3 mL/kg/hr at 06/02/23 0735    sodium chloride, 3 mL/kg/hr, Last Rate: 3 mL/kg/hr (06/02/23 0735)        Medications Prior to Admission   Medication Sig Dispense Refill Last Dose   • Ascorbic Acid (VITAMIN C PO) Take 1,000 mg by mouth Daily.   6/1/2023   • aspirin 81 MG chewable tablet Chew 1 tablet Daily.   6/1/2023   • Cholecalciferol (VITAMIN D3 PO) Take 2,000 mg by mouth Daily.   6/1/2023   • DIGESTIVE ENZYMES PO Take 1 tablet by mouth Daily.   6/1/2023   • KRILL OIL PO Take 2 tablets by mouth Daily.   6/1/2023   • Misc Natural Products (PROSTATE SUPPORT PO) Take 1 tablet by mouth 2 (Two) Times a Day. Super Beta Prostate   6/1/2023   • Probiotic Product (Probiotic Daily) capsule Take 1 tablet by mouth Daily.   6/1/2023   • Saw Palmetto, Serenoa repens, (SAW PALMETTO PO) Take 1 tablet by mouth Daily.   6/1/2023   • nitroglycerin (NITROSTAT) 0.4 MG SL tablet Place 1 tablet under the tongue Every 5 (Five) Minutes As Needed for Chest Pain. Take no more than 3 doses in 15 minutes. 25 tablet 3 5/31/2023         Subjective .   History of present illness:    Patient is a 77-year-old  male who presents today for cardiac catheterization secondary to unstable angina and previous history of coronary artery disease.  Patient notes over the last week recurrent increasing frequency midsternal chest discomfort for which she has been using significant amounts of sublingual nitroglycerin.  Reports this feels similar to his previous angina/heart attacks.  Can be brought on with exertion.  Notes that he has decreased his physical activity within the last week.  No reported syncope, near syncope.  Does have radiation of his discomfort up into his left shoulder.  Due to a severe episode requiring 3 sublingual nitroglycerin our office was contacted and he was subsequently scheduled for cardiac catheterization by his primary cardiologist Dr. Sneed.      Social History     Socioeconomic  "History   • Marital status:    Tobacco Use   • Smoking status: Former     Years: 25.00     Types: Cigarettes     Quit date:      Years since quittin.4   • Smokeless tobacco: Never   Vaping Use   • Vaping Use: Never used   Substance and Sexual Activity   • Alcohol use: Yes     Comment: social   • Drug use: Never   • Sexual activity: Defer     Family History   Problem Relation Age of Onset   • Stroke Other    • Colon cancer Neg Hx          Review of Systems:  Review of Systems   Constitutional: Positive for malaise/fatigue. Negative for fever.   HENT: Negative for nosebleeds.    Eyes: Negative for redness and visual disturbance.   Cardiovascular: Positive for chest pain. Negative for orthopnea, palpitations and paroxysmal nocturnal dyspnea.   Respiratory: Negative for cough, snoring, sputum production and wheezing.    Hematologic/Lymphatic: Negative for bleeding problem.   Skin: Negative for flushing, itching and rash.   Musculoskeletal: Positive for arthritis. Negative for falls, joint pain and muscle cramps.   Gastrointestinal: Negative for abdominal pain, diarrhea, heartburn, nausea and vomiting.   Genitourinary: Negative for hematuria.   Neurological: Negative for excessive daytime sleepiness, dizziness, headaches, tremors and weakness.   Psychiatric/Behavioral: Negative for substance abuse. The patient is not nervous/anxious.               Objective   Vitals:  /86 (BP Location: Left arm, Patient Position: Lying)   Pulse 60   Temp 97 °F (36.1 °C) (Temporal)   Resp 18   Ht 180.3 cm (71\")   Wt 92.4 kg (203 lb 9.6 oz)   SpO2 96%   BMI 28.40 kg/m²        Vitals reviewed.   Constitutional:       Appearance: Healthy appearance. Well-developed and not in distress.   Neck:      Vascular: No JVD.      Trachea: No tracheal deviation.   Pulmonary:      Effort: Pulmonary effort is normal.      Breath sounds: Normal breath sounds.   Cardiovascular:      Normal rate. Regular rhythm.      Comments: " Right Donis's positive  Pulses:     Intact distal pulses.   Edema:     Peripheral edema absent.   Abdominal:      General: Bowel sounds are normal.      Palpations: Abdomen is soft.      Tenderness: There is no abdominal tenderness.   Musculoskeletal:         General: No deformity. Skin:     General: Skin is warm and dry.   Neurological:      Mental Status: Alert and oriented to person, place, and time.              Results Review:  I reviewed the patient's new clinical results.  Results from last 7 days   Lab Units 06/02/23  0703   WBC 10*3/mm3 5.38   HEMOGLOBIN g/dL 14.5   HEMATOCRIT % 44.2   PLATELETS 10*3/mm3 166     Results from last 7 days   Lab Units 06/02/23  0712 06/02/23  0703   SODIUM mmol/L  --  138   POTASSIUM mmol/L  --  4.2   CHLORIDE mmol/L  --  107   CO2 mmol/L  --  23.0   BUN mg/dL  --  19   CREATININE mg/dL 1.00 0.95   CALCIUM mg/dL  --  8.9   BILIRUBIN mg/dL  --  0.5   ALK PHOS U/L  --  74   ALT (SGPT) U/L  --  18   AST (SGOT) U/L  --  19   GLUCOSE mg/dL  --  107*     Results from last 7 days   Lab Units 06/02/23  0712 06/02/23  0703   SODIUM mmol/L  --  138   POTASSIUM mmol/L  --  4.2   CHLORIDE mmol/L  --  107   CO2 mmol/L  --  23.0   BUN mg/dL  --  19   CREATININE mg/dL 1.00 0.95   GLUCOSE mg/dL  --  107*   CALCIUM mg/dL  --  8.9         No results found for: TROPONINT      Results from last 7 days   Lab Units 06/02/23  0703   CHOLESTEROL mg/dL 204*   TRIGLYCERIDES mg/dL 49   HDL CHOL mg/dL 56   LDL CHOL mg/dL 139*               Tele: Sinus rhythm          Assessment & Plan     1. Unstable angina  2. Coronary artery disease with history of multiple stents  3. Dyslipidemia, intolerant to Lipitor.  LDL elevated.      Plan:    1. We will proceed to cardiac catheterization plus or minus catheter-based intervention today.  This was discussed with the patient and family.  They verbalized understanding and wished to proceed.  Further recommendations to follow cardiac catheterization.        Corinne  ROMAIN Arellano   Dictated utilizing Dragon dictation

## 2023-06-05 ENCOUNTER — CALL CENTER PROGRAMS (OUTPATIENT)
Dept: CALL CENTER | Facility: HOSPITAL | Age: 78
End: 2023-06-05
Payer: MEDICARE

## 2023-06-05 ENCOUNTER — TELEPHONE (OUTPATIENT)
Dept: CARDIOLOGY | Facility: CLINIC | Age: 78
End: 2023-06-05
Payer: MEDICARE

## 2023-06-05 ENCOUNTER — DOCUMENTATION (OUTPATIENT)
Dept: CARDIAC REHAB | Facility: HOSPITAL | Age: 78
End: 2023-06-05
Payer: MEDICARE
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
## 2023-06-05 NOTE — TELEPHONE ENCOUNTER
I told have a good way to connect Plavix and low blood pressure.  Important for him to stay on it at this time with recent cardiac stents.  I think if he is currently asymptomatic with blood pressures in the upper 90s systolic no new changes needed.  Please let us know if it is going below 90 systolic or if he has new symptoms

## 2023-06-05 NOTE — OUTREACH NOTE
"PCI/Device Survey      Flowsheet Row Responses   Facility patient discharged from? [place]   Procedure date 06/02/23   Procedure (if device, specify in description) PCI   PCI site Right, Arm   Performing MD Dr. Nain Edgar   Attempt successful? Yes   Call start time [phone]   Call end time [phone]   Person spoke with today (if not patient) and relationship wife   Has the patient had any of the following symptoms since discharge? Dizziness or lightheadedness   Nursing interventions Patient education provided   Is the patient taking prescribed medications: ASA, Plavix   Nursing intervention Reminded to continue to take prescribed medications, Nurse provided patient education   Medication comments Wife reports that the pt has a h/o adverse affects w/ Plavix, which include, low BP, dizziness, \"passing out\" frequently while driving.He is currently having dizziness and his BP 98/54 HR 62, which is low for him per wife. She has called Dr. Sneed to discuss changing Plavix. Educated to continue until told by MD otherwise, change positions slowly, carefully since h/o passing out. They will take his BP sitting and then standing after this call to give results to Dr. Sneed upon return call.   Does the patient have any of the following symptoms related to the cath/surgical site? --  [Right radial site is healing well. ]   Nursing intervention Patient education provided   Does the patient have an appointment scheduled with the cardiologist? Yes   If the patient is a current smoker, are they able to teach back resources for cessation? Not a smoker   Did the patient feel prepared to go home on the same day as the procedure? Yes   Is the patient satisfied with the same day discharge process? Yes   PCI/Device call completed Yes   Wrap up additional comments New to [place] from [place].            Alyce GARRIDO - Registered Nurse  "

## 2023-06-07 ENCOUNTER — DOCUMENTATION (OUTPATIENT)
Dept: CARDIAC REHAB | Facility: HOSPITAL | Age: 78
End: 2023-06-07
Payer: MEDICARE

## 2023-06-07 NOTE — PROGRESS NOTES
Cardiac Rehab staff mailed referral letter to patient regarding Phase II Cardiac Rehab program. Instruction for patient to contact [facility] Cardiac Rehab Department for additional program information and to forward referral to closest Cardiac Rehab program.

## 2023-06-19 ENCOUNTER — TELEPHONE (OUTPATIENT)
Dept: CARDIOLOGY | Facility: CLINIC | Age: 78
End: 2023-06-19
Payer: MEDICARE

## 2023-06-19 NOTE — TELEPHONE ENCOUNTER
Spoke with patient's wife. Let her know that her 's beside home monitor isn't connecting. Patient wasn't currently home, so she said she would tell him when he got back. I told her that if he needed assistance in getting the monitor working to call the phone number on the monitor. She was agreeable to this.

## 2023-08-07 ENCOUNTER — HOSPITAL ENCOUNTER (OUTPATIENT)
Dept: RADIATION ONCOLOGY | Facility: HOSPITAL | Age: 78
Setting detail: RADIATION/ONCOLOGY SERIES
Discharge: HOME | End: 2023-08-07
Payer: MEDICARE

## 2023-08-07 ENCOUNTER — OFFICE VISIT (OUTPATIENT)
Dept: RADIATION ONCOLOGY | Facility: HOSPITAL | Age: 78
End: 2023-08-07
Payer: MEDICARE

## 2023-08-07 VITALS
OXYGEN SATURATION: 97 % | SYSTOLIC BLOOD PRESSURE: 128 MMHG | TEMPERATURE: 97.1 F | HEART RATE: 71 BPM | WEIGHT: 203 LBS | RESPIRATION RATE: 20 BRPM | BODY MASS INDEX: 27.5 KG/M2 | HEIGHT: 72 IN | DIASTOLIC BLOOD PRESSURE: 68 MMHG

## 2023-08-07 DIAGNOSIS — C61 PROSTATE CANCER: Primary | ICD-10-CM

## 2023-08-07 PROCEDURE — G0463 HOSPITAL OUTPT CLINIC VISIT: HCPCS

## 2023-08-07 RX ORDER — CHLORHEXIDINE GLUCONATE 0.12 MG/ML
RINSE ORAL
COMMUNITY
Start: 2023-05-17

## 2023-08-07 NOTE — PROGRESS NOTES
CONSULTATION NOTE      :                                                          1945  DATE OF CONSULTATION:                       2023   REQUESTING PHYSICIAN:                   Luis Ashley MD  REASON FOR CONSULTATION:           Prostate cancer  - Stage IIB (cT2c, cN0, cM0, PSA: 6, Grade Group: 2)         BRIEF HISTORY:  The patient is a very pleasant 78 y.o. male  with recent diagnosis of prostate cancer.  He has a history of moderate BPH symptoms improved using saw palmetto and supplements over-the-counter.  PSA was significantly increased compared to annual results with value 6.033 ng/ml on 4/10/2023.  Biopsy on 2023 showed presence of prostatic adenocarcinoma bilaterally.  6 out of 6 cores in the left lobe contained [place]'s 3+4=7 (20 to 30% pattern 4) with tumor involving 5 to 100% of submitted tissue samples.  There was presence of perineural invasion.  There was no cribriform pattern.  1 out of 6 cores from the right lobe contained [place]'s 3+3 = 6 involving 10% from the right base.  Patient is otherwise fairly healthy and active with corrected cardiovascular disease.  He has cardiac stents and pacemaker.  He has a history of prostate cancer in his family.  His brother underwent radiotherapy.  He should certainly have predicted longevity greater than 10 years, sufficient to warrant treatment intervention for this prostate cancer.    Allergy:   Allergies   Allergen Reactions    Lipitor [Atorvastatin] Provider Review Needed     Flu like symptoms    Oatmeal Other (See Comments)     Foggy feeling, swollen glands, myalgia       Social History:   Social History     Socioeconomic History    Marital status:    Tobacco Use    Smoking status: Former     Years: 25.00     Types: Cigarettes     Quit date:      Years since quittin.6    Smokeless tobacco: Never   Vaping Use    Vaping Use: Never used   Substance and Sexual Activity    Alcohol use: Yes     Alcohol/week: 1.0  standard drink     Types: 1 Glasses of wine per week     Comment: one drink per day    Drug use: Never    Sexual activity: Defer       Past Medical History:   Past Medical History:   Diagnosis Date    Bradycardia     s/p pacemaker    CAD (coronary artery disease)     Hearing difficulty     Heart attack 2017    Hiatal hernia     Hyperthyroidism     Orthostatic hypotension     Prostate cancer     Refusal of statin medication by patient     Refused pneumococcal vaccination     Sick sinus syndrome     Skin cancer     basal cell cancer on right ear    Snores     Trigger finger     Left thumb       Family History: family history includes Parkinsonism in his father; Prostate cancer in his brother; Stroke in his mother and another family member.     Surgical History:   Past Surgical History:   Procedure Laterality Date    BACK SURGERY      fusion/deiscectomy    CARDIAC CATHETERIZATION      06/02/2023 PER DR. LAWRENCE    CARDIAC CATHETERIZATION N/A 06/02/2023    Procedure: Left Heart Cath;  Surgeon: Nain Lawrence MD;  Location: [facility] CATH INVASIVE LOCATION;  Service: Cardiovascular;  Laterality: N/A;  Please schedule with Interventionalist.    COLONOSCOPY      2015    CORONARY ANGIOPLASTY  2003    x6 stents    CORONARY ANGIOPLASTY WITH STENT PLACEMENT  2017    x 3 stent    INGUINAL HERNIA REPAIR Bilateral     INSERT / REPLACE / REMOVE PACEMAKER  2020        Review of Systems:   Review of Systems   Constitutional:  Positive for fatigue.   HENT:   Positive for hearing loss.    Musculoskeletal:  Positive for back pain.                 IPSS Questionnaire (AUA-7):  Over the past month.    1)  Incomplete Emptying  How often have you had a sensation of not emptying your bladder?  0 - Not at all   2)  Frequency  How often have you had to urinate less than every two hours? 1 - Less than 1 time in 5   3)  Intermittency  How often have you found you stopped and started again several times when you urinated?  3 - About half the time  "  4) Urgency  How often have you found it difficult to postpone urination?  2 - Less than half the time   5) Weak Stream  How often have you had a weak urinary stream?  4 - More than half the time   6) Straining  How often have you had to push or strain to begin urination?  4 - More than half the time   7) Nocturia  How many times did you typically get up at night to urinate?  5 - 5+ times   Total Score:  19       Quality of life due to urinary symptoms:  If you were to spend the rest of your life with your urinary condition the way it is now, how would you feel about that? 5-Unhappy   Urine Leakage (Incontinence) 0-No Leakage     Sexual Health Inventory  Current Status    1)  How do you rate your confidence that you could achieve and keep an erection? 4-High   2) When you had erections with sexual stimulation, how often were your erections hard enough for penetration (entering your partner)? 0-No sexual activity   3)  During sexual intercourse, how often were you able to maintain your erection after you had penetrated (entered) into your partner? 0-Did not attempt intercourse   4) During sexual intercourse, how difficult was it to maintain your erection to completion of intercourse? 0-Did not attempt intercourse   5) When you attempted sexual intercourse, how often was it satisfactory to you? 0-No sexual activity   Total Score: 5       Bowel Health Inventory  Current Status: 0-No problems, no rectal bleeding, no discharge, less then 5 bowel movements a day            Objective   VITAL SIGNS:   Vitals:    08/07/23 1256   BP: 128/68   Pulse: 71   Resp: 20   Temp: 97.1 øF (36.2 øC)   TempSrc: Skin   SpO2: 97%   Weight: 92.1 kg (203 lb)   Height: 182.9 cm (72\")   PainSc:   6   PainLoc: Foot            KSP %:  80    Physical Exam:   Physical Exam  Vitals and nursing note reviewed.   Constitutional:       Appearance: He is well-developed.   HENT:      Head: Normocephalic and atraumatic.   Cardiovascular:      Rate and " Rhythm: Normal rate and regular rhythm.      Heart sounds: Normal heart sounds. No murmur heard.  Pulmonary:      Effort: Pulmonary effort is normal.      Breath sounds: Normal breath sounds. No wheezing or rales.   Abdominal:      General: Bowel sounds are normal. There is no distension.      Palpations: Abdomen is soft.      Tenderness: There is no abdominal tenderness.   Genitourinary:     Prostate: Enlarged (Approximate 30 cc, left lobe slightly larger than the right and with induration diffusely but surface is smooth and there are no palpable he is not cold.  Seminal vesicles are nonpalpable.). Not tender and no nodules present.      Rectum: No mass, tenderness, anal fissure, external hemorrhoid or internal hemorrhoid. Normal anal tone.   Musculoskeletal:         General: No tenderness. Normal range of motion.      Cervical back: Normal range of motion and neck supple.   Lymphadenopathy:      Cervical: No cervical adenopathy.      Upper Body:      Right upper body: No supraclavicular adenopathy.      Left upper body: No supraclavicular adenopathy.   Skin:     General: Skin is warm and dry.   Neurological:      Mental Status: He is alert and oriented to person, place, and time.      Sensory: No sensory deficit.   Psychiatric:         Behavior: Behavior normal.         Thought Content: Thought content normal.         Judgment: Judgment normal.            The following portions of the patient's history were reviewed and updated as appropriate: allergies, current medications, past family history, past medical history, past social history, past surgical history, and problem list.    Assessment:   Assessment      Prostate cancer, [place]'s 3+4=7, clinical stage IIB (T2c, N0, M0), PSA 6.033 ng/ml.  Definitive treatment would be preferred over active surveillance.  We reviewed the radiotherapy treatment options using intensity modulated radiotherapy versus stereotactic body radiotherapy versus brachytherapy.  Patient  would like to proceed with stereotactic body radiotherapy as definitive treatment.  The CyberKnife treatment procedures been reviewed in detail.  Informed consent was obtained.    RECOMMENDATIONS: He will return to Dr. Ashley for placement of gold seed fiducials.  We will verify that screening colonoscopy is up-to-date.  Patient will subsequently return here for treatment planning CT.  We will try to get cardiac clearance for performing an MRI pelvis.  The prostate gland and proximal seminal vesicles will receive 5 daily fractions of 7 Walker each, delivered on the CyberKnife.    I spent a total of 55 minutes on todays visit, with more than 45 minutes in direct face to face communication, and the remainder of the time spent in reviewing the relevant history, records, available imaging, and for documentation.    I will try to send notes to [facility], Dr. Fontana, once patient furnishings this with contact information.    Follow Up:   Return in about 4 weeks (around 9/4/2023) for Office Visit, Simulation.  Diagnoses and all orders for this visit:    1. Prostate cancer (Primary)  -     MRI Cyberknife Pelvis Without Contrast; Future         Luis Hughes MD

## 2023-08-15 DIAGNOSIS — C61 PROSTATE CANCER: Primary | ICD-10-CM

## 2023-09-14 ENCOUNTER — DOCUMENTATION (OUTPATIENT)
Dept: NUTRITION | Facility: HOSPITAL | Age: 78
End: 2023-09-14
Payer: MEDICARE

## 2023-09-22 ENCOUNTER — OFFICE VISIT (OUTPATIENT)
Dept: RADIATION ONCOLOGY | Facility: HOSPITAL | Age: 78
End: 2023-09-22
Payer: MEDICARE

## 2023-09-22 ENCOUNTER — HOSPITAL ENCOUNTER (OUTPATIENT)
Dept: RADIATION ONCOLOGY | Facility: HOSPITAL | Age: 78
Setting detail: RADIATION/ONCOLOGY SERIES
Discharge: HOME | End: 2023-09-22
Payer: MEDICARE

## 2023-09-22 ENCOUNTER — HOSPITAL ENCOUNTER (OUTPATIENT)
Dept: MRI IMAGING | Facility: HOSPITAL | Age: 78
Discharge: HOME | End: 2023-09-22
Admitting: RADIOLOGY
Payer: MEDICARE

## 2023-09-22 VITALS
DIASTOLIC BLOOD PRESSURE: 79 MMHG | TEMPERATURE: 96.8 F | OXYGEN SATURATION: 95 % | RESPIRATION RATE: 20 BRPM | WEIGHT: 201.6 LBS | HEART RATE: 75 BPM | SYSTOLIC BLOOD PRESSURE: 124 MMHG | BODY MASS INDEX: 27.34 KG/M2

## 2023-09-22 DIAGNOSIS — C61 PROSTATE CANCER: ICD-10-CM

## 2023-09-22 DIAGNOSIS — C61 PROSTATE CANCER: Primary | ICD-10-CM

## 2023-09-22 PROCEDURE — 72195 MRI PELVIS W/O DYE: CPT

## 2023-09-22 PROCEDURE — G0463 HOSPITAL OUTPT CLINIC VISIT: HCPCS

## 2023-09-22 RX ORDER — TAMSULOSIN HYDROCHLORIDE 0.4 MG/1
1 CAPSULE ORAL NIGHTLY
Qty: 30 CAPSULE | Refills: 11 | Status: SHIPPED | OUTPATIENT
Start: 2023-09-22

## 2023-09-22 NOTE — PROGRESS NOTES
RE-EVALUATION    PATIENT:                                                      Lizandro Culp  :                                                          1945  DATE:                          2023   DIAGNOSIS:     Prostate cancer  - Stage IIB (cT2c, cN0, cM0, PSA: 6, Grade Group: 2)         BRIEF HISTORY:  The patient is a very pleasant 78 y.o. male  with intermediate risk prostate cancer.  He chose to undergo definitive treatment with CyberKnife stereotactic body radiotherapy.  He tolerated placement of gold seed fiducials with no difficulty.  No change in urinary voiding.  No other change in health since informed consent was obtained on 2023.  He remains a candidate for SBRT.  Returns today for simulation in preparation for treatment.    Allergies   Allergen Reactions    Lipitor [Atorvastatin] Provider Review Needed     Flu like symptoms    Oatmeal Other (See Comments)     Foggy feeling, swollen glands, myalgia       Review of Systems   HENT:   Positive for hearing loss.    All other systems reviewed and are negative.        IPSS Questionnaire (AUA-7):  Over the past month…     1)  Incomplete Emptying  How often have you had a sensation of not emptying your bladder?  0 - Not at all   2)  Frequency  How often have you had to urinate less than every two hours? 1 - Less than 1 time in 5   3)  Intermittency  How often have you found you stopped and started again several times when you urinated?  3 - About half the time   4) Urgency  How often have you found it difficult to postpone urination?  2 - Less than half the time   5) Weak Stream  How often have you had a weak urinary stream?  4 - More than half the time   6) Straining  How often have you had to push or strain to begin urination?  4 - More than half the time   7) Nocturia  How many times did you typically get up at night to urinate?  5 - 5+ times   Total Score:  19         Quality of life due to urinary symptoms:  If you were to spend the  rest of your life with your urinary condition the way it is now, how would you feel about that? 5-Unhappy   Urine Leakage (Incontinence) 0-No Leakage      Sexual Health Inventory  Current Status     1)  How do you rate your confidence that you could achieve and keep an erection? 4-High   2) When you had erections with sexual stimulation, how often were your erections hard enough for penetration (entering your partner)? 0-No sexual activity   3)  During sexual intercourse, how often were you able to maintain your erection after you had penetrated (entered) into your partner? 0-Did not attempt intercourse   4) During sexual intercourse, how difficult was it to maintain your erection to completion of intercourse? 0-Did not attempt intercourse   5) When you attempted sexual intercourse, how often was it satisfactory to you? 0-No sexual activity   Total Score: 5         Bowel Health Inventory  Current Status: 0-No problems, no rectal bleeding, no discharge, less then 5 bowel movements a day           Objective   VITAL SIGNS:   Vitals:    09/22/23 1233   BP: 124/79   Pulse: 75   Resp: 20   Temp: 96.8 °F (36 °C)   TempSrc: Skin   SpO2: 95%   Weight: 91.4 kg (201 lb 9.6 oz)   PainSc: 0-No pain            KSP %:  90    Physical Exam  Vitals and nursing note reviewed.   Constitutional:       Appearance: He is well-developed.   HENT:      Head: Normocephalic and atraumatic.   Cardiovascular:      Rate and Rhythm: Normal rate and regular rhythm.      Heart sounds: No murmur heard.  Pulmonary:      Effort: Pulmonary effort is normal.      Breath sounds: No wheezing or rales.   Abdominal:      General: There is no distension.      Palpations: Abdomen is soft.      Tenderness: There is no abdominal tenderness.   Musculoskeletal:         General: No tenderness. Normal range of motion.      Cervical back: Normal range of motion and neck supple.   Skin:     General: Skin is warm and dry.   Neurological:      Mental Status: He is  alert and oriented to person, place, and time.      Sensory: No sensory deficit.   Psychiatric:         Behavior: Behavior normal.         Thought Content: Thought content normal.         Judgment: Judgment normal.            The following portions of the patient's history were reviewed and updated as appropriate: allergies, current medications, past family history, past medical history, past social history, past surgical history, and problem list.    Diagnoses and all orders for this visit:    Prostate cancer    Other orders  -     tamsulosin (FLOMAX) 0.4 MG capsule 24 hr capsule; Take 1 capsule by mouth Every Night.      IMPRESSION:  Prostate cancer, [place]'s 3+4=7, clinical stage IIB (T2c, N0, M0), PSA 6.033 ng/ml.     RECOMMENDATIONS: Today he will undergo treatment planning CT and MRI pelvis.  The prostate gland and proximal seminal vesicles will receive 5 daily fractions of 7 Walker, delivered on CyberKnife.         Luis Hughes MD    Approximately 15 minutes was spent during this visit and half of that time with patient reviewing procedure.

## 2023-09-25 PROCEDURE — 77399 UNLISTED PX MED RADJ PHYSICS: CPT | Performed by: RADIOLOGY

## 2023-10-02 ENCOUNTER — HOSPITAL ENCOUNTER (OUTPATIENT)
Dept: RADIATION ONCOLOGY | Facility: HOSPITAL | Age: 78
Setting detail: RADIATION/ONCOLOGY SERIES
Discharge: HOME | End: 2023-10-02
Payer: MEDICARE

## 2023-10-06 PROCEDURE — 77300 RADIATION THERAPY DOSE PLAN: CPT | Performed by: RADIOLOGY

## 2023-10-06 PROCEDURE — 77301 RADIOTHERAPY DOSE PLAN IMRT: CPT | Performed by: RADIOLOGY

## 2023-10-06 PROCEDURE — 77338 DESIGN MLC DEVICE FOR IMRT: CPT | Performed by: RADIOLOGY

## 2023-10-16 ENCOUNTER — HOSPITAL ENCOUNTER (OUTPATIENT)
Dept: RADIATION ONCOLOGY | Facility: HOSPITAL | Age: 78
Discharge: HOME | End: 2023-10-16
Payer: MEDICARE

## 2023-10-16 PROCEDURE — 77373 STRTCTC BDY RAD THER TX DLVR: CPT | Performed by: RADIOLOGY

## 2023-10-17 ENCOUNTER — HOSPITAL ENCOUNTER (OUTPATIENT)
Dept: RADIATION ONCOLOGY | Facility: HOSPITAL | Age: 78
Discharge: HOME | End: 2023-10-17
Payer: MEDICARE

## 2023-10-17 PROCEDURE — 77373 STRTCTC BDY RAD THER TX DLVR: CPT | Performed by: RADIOLOGY

## 2023-10-18 ENCOUNTER — HOSPITAL ENCOUNTER (OUTPATIENT)
Dept: RADIATION ONCOLOGY | Facility: HOSPITAL | Age: 78
Discharge: HOME | End: 2023-10-18

## 2023-10-18 PROCEDURE — 77373 STRTCTC BDY RAD THER TX DLVR: CPT | Performed by: RADIOLOGY

## 2023-10-19 ENCOUNTER — HOSPITAL ENCOUNTER (OUTPATIENT)
Dept: RADIATION ONCOLOGY | Facility: HOSPITAL | Age: 78
Discharge: HOME | End: 2023-10-19

## 2023-10-19 PROCEDURE — 77373 STRTCTC BDY RAD THER TX DLVR: CPT | Performed by: RADIOLOGY

## 2023-10-20 ENCOUNTER — HOSPITAL ENCOUNTER (OUTPATIENT)
Dept: RADIATION ONCOLOGY | Facility: HOSPITAL | Age: 78
Discharge: HOME | End: 2023-10-20

## 2023-10-20 PROCEDURE — 77336 RADIATION PHYSICS CONSULT: CPT | Performed by: RADIOLOGY

## 2023-10-20 PROCEDURE — 77373 STRTCTC BDY RAD THER TX DLVR: CPT | Performed by: RADIOLOGY

## 2023-11-30 ENCOUNTER — HOSPITAL ENCOUNTER (OUTPATIENT)
Dept: RADIATION ONCOLOGY | Facility: HOSPITAL | Age: 78
Setting detail: RADIATION/ONCOLOGY SERIES
Discharge: HOME | End: 2023-11-30
Payer: MEDICARE

## 2023-11-30 ENCOUNTER — OFFICE VISIT (OUTPATIENT)
Dept: RADIATION ONCOLOGY | Facility: HOSPITAL | Age: 78
End: 2023-11-30
Payer: MEDICARE

## 2023-11-30 DIAGNOSIS — C61 PROSTATE CANCER: Primary | ICD-10-CM

## 2023-11-30 NOTE — PROGRESS NOTES
TELEMEDICINE FOLLOW UP NOTE    PATIENT:                                                      Lizandro Culp  MEDICAL RECORD #:                        [id number]  :                                                          1945  COMPLETION DATE:   10/20/2023  DIAGNOSIS:     Prostate cancer  - Stage IIB (cT2c, cN0, cM0, PSA: 6, Grade Group: 2)      This visit has been converted to a telehealth virtual visit, the patient's preferred method for today's follow-up.  Total time of discussion was 15 minutes.  The patient has given verbal consent.    BRIEF HISTORY:    Initial follow-up visit for low intermediate risk prostate cancer.  He underwent definitive treatment with a course of CyberKnife stereotactic body radiotherapy.  He tolerated treatment well.  Initial posttreatment fatigue is gradually subsiding.  He does endorse some increased urinary frequency beyond his chronic baseline moderate BPH symptoms.  He was recently seen by Dr. Ashley who recommended increasing daily Flomax to twice daily dosing, which has reportedly helped with both frequency and strength of urinary stream.  The patient otherwise denies dysuria, hematuria, or urinary incontinence.  He reports bowel function is normal.  He denies GI complaints.  The patient overall feels well.           IPSS Questionnaire (AUA-7):  Over the past month…     1)  Incomplete Emptying  How often have you had a sensation of not emptying your bladder?  0 - Not at all   2)  Frequency  How often have you had to urinate less than every two hours? 2 - Less than half the time   3)  Intermittency  How often have you found you stopped and started again several times when you urinated?  3 - About half the time   4) Urgency  How often have you found it difficult to postpone urination?  2 - Less than half the time   5) Weak Stream  How often have you had a weak urinary stream?  3 - About half the time   6) Straining  How often have you had to push or strain to begin  urination?  4 - More than half the time   7) Nocturia  How many times did you typically get up at night to urinate?  5 - 5+ times   Total Score:  19         Quality of life due to urinary symptoms:  If you were to spend the rest of your life with your urinary condition the way it is now, how would you feel about that? 5-Unhappy   Urine Leakage (Incontinence) 0-No Leakage      Sexual Health Inventory  Current Status     1)  How do you rate your confidence that you could achieve and keep an erection? 4-High   2) When you had erections with sexual stimulation, how often were your erections hard enough for penetration (entering your partner)? 0-No sexual activity   3)  During sexual intercourse, how often were you able to maintain your erection after you had penetrated (entered) into your partner? 0-Did not attempt intercourse   4) During sexual intercourse, how difficult was it to maintain your erection to completion of intercourse? 0-Did not attempt intercourse   5) When you attempted sexual intercourse, how often was it satisfactory to you? 0-No sexual activity   Total Score: 4         Bowel Health Inventory  Current Status: 0-No problems, no rectal bleeding, no discharge, less then 5 bowel movements a day          MEDICATIONS: Medication reconciliation for the patient was reviewed and confirmed in the electronic medical record.    Review of Systems   Constitutional:  Positive for fatigue.   HENT:   Positive for hearing loss.    Genitourinary:  Positive for frequency and nocturia.    All other systems reviewed and are negative.          KPS 90%      Physical Exam  Pulmonary:      Respirations even, unlabored. No audible wheezing or cough.  Neurological:      A+Ox4, conversant, answers questions appropriately.  Psychiatric:     Judgement, affect, and decision-making WNL.    Limited physical exam as visit was conducted remotely via telephone.           The following portions of the patient's history were reviewed and  updated as appropriate: allergies, current medications, past family history, past medical history, past social history, past surgical history and problem list.         Diagnoses and all orders for this visit:    1. Prostate cancer (Primary)         IMPRESSION:   Prostate cancer, [place]'s 3+4=7, clinical stage IIB (T2c, N0, M0), PSA 6.033 ng/ml.    1 month status post CyberKnife SBRT.  He tolerated treatment well.  He developed the anticipated acute grade 1 fatigue and lower urinary tract symptoms which continue to subside.  He has increased Flomax to BID dose at the recommendation of his urologist with subsequent improvement.  We discussed that hopefully the patient will be able to taper/discontinue alpha-blocker with time as acute exacerbation of chronic lower urinary tract/outflow obstructive symptoms candie.    Mr. Culp and I have reviewed the survivorship care plan in detail.  We discussed diagnosis, follow-up intervals, biannual PSA monitoring, and expectations for response to treatment.  A copy of the care plan has been made to the patient.  A copy has also been sent to his PCP.    RECOMMENDATIONS:  Mr. Culp continues routine urologic surveillance under the care of Dr. Ashley, with follow-up and repeat PSA scheduled 2/6/2024.      Return in about 6 months (around 5/30/2024) for Office Visit.    ROMAIN Calero spent a total of 35 minutes on today's visit, with more than 15 minutes in virtual communication with the patient via telephone, and the remainder of the time spent in reviewing the relevant history, records, available imaging, and for documentation.

## 2023-12-15 ENCOUNTER — HOSPITAL ENCOUNTER (EMERGENCY)
Facility: HOSPITAL | Age: 78
Discharge: HOME | End: 2023-12-15
Attending: EMERGENCY MEDICINE
Payer: MEDICARE

## 2023-12-15 ENCOUNTER — APPOINTMENT (OUTPATIENT)
Dept: GENERAL RADIOLOGY | Facility: HOSPITAL | Age: 78
End: 2023-12-15
Payer: MEDICARE

## 2023-12-15 VITALS
RESPIRATION RATE: 20 BRPM | TEMPERATURE: 98.4 F | OXYGEN SATURATION: 95 % | DIASTOLIC BLOOD PRESSURE: 67 MMHG | SYSTOLIC BLOOD PRESSURE: 100 MMHG | HEART RATE: 73 BPM | WEIGHT: 202 LBS | HEIGHT: 72 IN | BODY MASS INDEX: 27.36 KG/M2

## 2023-12-15 DIAGNOSIS — R68.83 CHILLS: Primary | ICD-10-CM

## 2023-12-15 LAB
ALBUMIN SERPL-MCNC: 4.2 G/DL (ref 3.5–5.2)
ALBUMIN/GLOB SERPL: 1.8 G/DL
ALP SERPL-CCNC: 75 U/L (ref 39–117)
ALT SERPL W P-5'-P-CCNC: 16 U/L (ref 1–41)
ANION GAP SERPL CALCULATED.3IONS-SCNC: 8.5 MMOL/L (ref 5–15)
AST SERPL-CCNC: 18 U/L (ref 1–40)
BASOPHILS # BLD AUTO: 0.02 10*3/MM3 (ref 0–0.2)
BASOPHILS NFR BLD AUTO: 0.2 % (ref 0–1.5)
BILIRUB SERPL-MCNC: 0.5 MG/DL (ref 0–1.2)
BILIRUB UR QL STRIP: NEGATIVE
BUN SERPL-MCNC: 19 MG/DL (ref 8–23)
BUN/CREAT SERPL: 22.1 (ref 7–25)
CALCIUM SPEC-SCNC: 8.7 MG/DL (ref 8.6–10.5)
CHLORIDE SERPL-SCNC: 104 MMOL/L (ref 98–107)
CLARITY UR: CLEAR
CO2 SERPL-SCNC: 22.5 MMOL/L (ref 22–29)
COLOR UR: YELLOW
CREAT SERPL-MCNC: 0.86 MG/DL (ref 0.76–1.27)
DEPRECATED RDW RBC AUTO: 49.1 FL (ref 37–54)
EGFRCR SERPLBLD CKD-EPI 2021: 89.2 ML/MIN/1.73
EOSINOPHIL # BLD AUTO: 0.02 10*3/MM3 (ref 0–0.4)
EOSINOPHIL NFR BLD AUTO: 0.2 % (ref 0.3–6.2)
ERYTHROCYTE [DISTWIDTH] IN BLOOD BY AUTOMATED COUNT: 13.5 % (ref 12.3–15.4)
FLUAV RNA RESP QL NAA+PROBE: NOT DETECTED
FLUBV RNA RESP QL NAA+PROBE: NOT DETECTED
GLOBULIN UR ELPH-MCNC: 2.4 GM/DL
GLUCOSE SERPL-MCNC: 125 MG/DL (ref 65–99)
GLUCOSE UR STRIP-MCNC: NEGATIVE MG/DL
HCT VFR BLD AUTO: 39.7 % (ref 37.5–51)
HGB BLD-MCNC: 13 G/DL (ref 13–17.7)
HGB UR QL STRIP.AUTO: NEGATIVE
IMM GRANULOCYTES # BLD AUTO: 0.03 10*3/MM3 (ref 0–0.05)
IMM GRANULOCYTES NFR BLD AUTO: 0.3 % (ref 0–0.5)
KETONES UR QL STRIP: ABNORMAL
LEUKOCYTE ESTERASE UR QL STRIP.AUTO: NEGATIVE
LYMPHOCYTES # BLD AUTO: 0.56 10*3/MM3 (ref 0.7–3.1)
LYMPHOCYTES NFR BLD AUTO: 4.8 % (ref 19.6–45.3)
MCH RBC QN AUTO: 32.3 PG (ref 26.6–33)
MCHC RBC AUTO-ENTMCNC: 32.7 G/DL (ref 31.5–35.7)
MCV RBC AUTO: 98.5 FL (ref 79–97)
MONOCYTES # BLD AUTO: 0.56 10*3/MM3 (ref 0.1–0.9)
MONOCYTES NFR BLD AUTO: 4.8 % (ref 5–12)
NEUTROPHILS NFR BLD AUTO: 10.41 10*3/MM3 (ref 1.7–7)
NEUTROPHILS NFR BLD AUTO: 89.7 % (ref 42.7–76)
NITRITE UR QL STRIP: NEGATIVE
NRBC BLD AUTO-RTO: 0 /100 WBC (ref 0–0.2)
PH UR STRIP.AUTO: 6.5 [PH] (ref 5–8)
PLATELET # BLD AUTO: 129 10*3/MM3 (ref 140–450)
PMV BLD AUTO: 10.4 FL (ref 6–12)
POTASSIUM SERPL-SCNC: 3.9 MMOL/L (ref 3.5–5.2)
PROCALCITONIN SERPL-MCNC: 0.1 NG/ML (ref 0–0.25)
PROT SERPL-MCNC: 6.6 G/DL (ref 6–8.5)
PROT UR QL STRIP: NEGATIVE
RBC # BLD AUTO: 4.03 10*6/MM3 (ref 4.14–5.8)
SARS-COV-2 RNA RESP QL NAA+PROBE: NOT DETECTED
SODIUM SERPL-SCNC: 135 MMOL/L (ref 136–145)
SP GR UR STRIP: 1.02 (ref 1–1.03)
UROBILINOGEN UR QL STRIP: ABNORMAL
WBC NRBC COR # BLD AUTO: 11.6 10*3/MM3 (ref 3.4–10.8)

## 2023-12-15 PROCEDURE — 71045 X-RAY EXAM CHEST 1 VIEW: CPT

## 2023-12-15 PROCEDURE — 80053 COMPREHEN METABOLIC PANEL: CPT | Performed by: EMERGENCY MEDICINE

## 2023-12-15 PROCEDURE — 99283 EMERGENCY DEPT VISIT LOW MDM: CPT

## 2023-12-15 PROCEDURE — 87636 SARSCOV2 & INF A&B AMP PRB: CPT

## 2023-12-15 PROCEDURE — 84145 PROCALCITONIN (PCT): CPT | Performed by: EMERGENCY MEDICINE

## 2023-12-15 PROCEDURE — 25810000003 SODIUM CHLORIDE 0.9 % SOLUTION: Performed by: EMERGENCY MEDICINE

## 2023-12-15 PROCEDURE — 85025 COMPLETE CBC W/AUTO DIFF WBC: CPT | Performed by: EMERGENCY MEDICINE

## 2023-12-15 PROCEDURE — 81003 URINALYSIS AUTO W/O SCOPE: CPT | Performed by: EMERGENCY MEDICINE

## 2023-12-15 RX ORDER — SODIUM CHLORIDE 0.9 % (FLUSH) 0.9 %
10 SYRINGE (ML) INJECTION AS NEEDED
Status: DISCONTINUED | OUTPATIENT
Start: 2023-12-15 | End: 2023-12-15 | Stop reason: HOSPADM

## 2023-12-15 RX ADMIN — SODIUM CHLORIDE 500 ML: 9 INJECTION, SOLUTION INTRAVENOUS at 10:32

## 2023-12-15 NOTE — ED PROVIDER NOTES
Subjective   History of Present Illness  78-year-old male presents to the ED with a chief complaint of flulike symptoms.  Patient states that he woke up in the middle the night and was feverish.  Temp of 99.9.  Patient states that he had fatigue chills and diffuse generalized body aches.  He has had a very mild cough.  He does have a history of prostate cancer status post radiation therapy almost 2 months ago in late October.  He is not having any dysuria.  No nausea vomiting diarrhea or abdominal pain.  He denies any chest pain.  No shortness of breath or wheeze.  No other complaints at this time.      Review of Systems   Constitutional:  Positive for chills, fatigue and fever.   Respiratory:  Positive for cough. Negative for shortness of breath and wheezing.    Cardiovascular:  Negative for chest pain and palpitations.   Gastrointestinal:  Negative for abdominal pain, diarrhea, nausea and vomiting.   Genitourinary:  Negative for dysuria and frequency.   All other systems reviewed and are negative.      Past Medical History:   Diagnosis Date    Bradycardia     s/p pacemaker    CAD (coronary artery disease)     Hearing difficulty     Heart attack 2017    Hiatal hernia     Hyperthyroidism     Orthostatic hypotension     Prostate cancer     Refusal of statin medication by patient     Refused pneumococcal vaccination     Sick sinus syndrome     Skin cancer     basal cell cancer on right ear    Snores     Trigger finger     Left thumb       Allergies   Allergen Reactions    Lipitor [Atorvastatin] Provider Review Needed     Flu like symptoms    Oatmeal Other (See Comments)     Foggy feeling, swollen glands, myalgia       Past Surgical History:   Procedure Laterality Date    BACK SURGERY      fusion/deiscectomy    CARDIAC CATHETERIZATION      06/02/2023 PER DR. LAWRENCE    CARDIAC CATHETERIZATION N/A 06/02/2023    Procedure: Left Heart Cath;  Surgeon: Nain Lawrence MD;  Location: [facility] CATH INVASIVE LOCATION;  Service:  Cardiovascular;  Laterality: N/A;  Please schedule with Interventionalist.    COLONOSCOPY      2015    CORONARY ANGIOPLASTY  2003    x6 stents    CORONARY ANGIOPLASTY WITH STENT PLACEMENT  2017    x 3 stent    CYBERKNIFE  10/20/2023    prostate/SV    INGUINAL HERNIA REPAIR Bilateral     INSERT / REPLACE / REMOVE PACEMAKER      PROSTATE FIDUCIAL MARKER PLACEMENT  2023       Family History   Problem Relation Age of Onset    Stroke Mother     Parkinsonism Father     Prostate cancer Brother     Stroke Other     Colon cancer Neg Hx        Social History     Socioeconomic History    Marital status:    Tobacco Use    Smoking status: Former     Years: 25     Types: Cigarettes     Quit date:      Years since quittin.9    Smokeless tobacco: Never   Vaping Use    Vaping Use: Never used   Substance and Sexual Activity    Alcohol use: Yes     Alcohol/week: 1.0 standard drink of alcohol     Types: 1 Glasses of wine per week     Comment: one drink per day    Drug use: Never    Sexual activity: Defer           Objective   Physical Exam  Vitals and nursing note reviewed.   Constitutional:       General: He is not in acute distress.     Appearance: Normal appearance. He is well-developed. He is not diaphoretic.   HENT:      Head: Normocephalic and atraumatic.      Nose: Nose normal.   Eyes:      Conjunctiva/sclera: Conjunctivae normal.      Pupils: Pupils are equal, round, and reactive to light.   Cardiovascular:      Rate and Rhythm: Normal rate and regular rhythm.      Pulses: Normal pulses.   Pulmonary:      Effort: Pulmonary effort is normal. No respiratory distress.      Breath sounds: Normal breath sounds.   Abdominal:      General: There is no distension.      Palpations: Abdomen is soft.      Tenderness: There is no abdominal tenderness.   Musculoskeletal:         General: No deformity.   Neurological:      Mental Status: He is alert and oriented to person, place, and time.      Cranial Nerves: No  cranial nerve deficit.      Coordination: Coordination normal.         Procedures           ED Course                                             Medical Decision Making  Problems Addressed:  Chills: complicated acute illness or injury    Amount and/or Complexity of Data Reviewed  Labs: ordered.  Radiology: ordered.    Risk  Prescription drug management.    Chief complaint: Flu symptoms    Differential diagnosis includes but not limited to: COVID/flu, viral illness, pneumonia, urinary tract infection, other    Patient arrives stable, afebrile, without respiratory distress with initial vitals interpreted by myself.  Pertinent initial vitals include afebrile within normal limits    Plan: CBC CMP urinalysis COVID/flu, chest x-ray    Results from initial plan were reviewed and interpreted by myself and include: Chest x-ray negative for acute process.  CBC shows minimally elevated white blood cell count of 11.6 with a mild left shift.  Hemoglobin of 13 hematocrit of 39.7.  Procalcitonin negative at 0.1.  CMP shows glucose 125 normal BUN normal creatinine normal sodium and potassium.  Urinalysis is negative for infection.  COVID/flu negative    Consultations N/A    Reevaluation resting comfortably.  Asymptomatic.    Discussion: Patient presents to the ED with flulike illness chills and body aches.  Workup was reassuring.  COVID and flu are negative.  Given his symptomatology do suspect most likely viral illness.  He is appropriate for discharge with strict return precaution given.  Patient's family agreeable to this plan.    Diagnostic information from other sources includes: Review of previous visits, prior labs, prior imaging, available notes from prior evaluations or visits with specialists, medication list, allergies, past medical history, past surgical history    Interventions in the ED included: IV fluid hydration    Disposition plan: Discharge.      Final diagnoses:   Chills       ED Disposition  ED Disposition        ED Disposition   Discharge    Condition   Stable    Comment   --               Dorcas Figueroa MD  [address] [facility] [address] [phone]               Medication List        Changed      tamsulosin 0.4 MG capsule 24 hr capsule  Commonly known as: FLOMAX  Take 1 capsule by mouth Every Night.  What changed: when to take this                 Brian Garcia, DO  12/15/23 1141

## 2024-03-11 ENCOUNTER — OFFICE VISIT (OUTPATIENT)
Dept: CARDIOLOGY | Facility: CLINIC | Age: 79
End: 2024-03-11
Payer: MEDICARE

## 2024-03-11 VITALS
SYSTOLIC BLOOD PRESSURE: 110 MMHG | HEART RATE: 68 BPM | BODY MASS INDEX: 28.36 KG/M2 | OXYGEN SATURATION: 96 % | HEIGHT: 71 IN | DIASTOLIC BLOOD PRESSURE: 60 MMHG | WEIGHT: 202.6 LBS

## 2024-03-11 DIAGNOSIS — I49.5 SSS (SICK SINUS SYNDROME): ICD-10-CM

## 2024-03-11 DIAGNOSIS — I25.810 CORONARY ARTERY DISEASE INVOLVING CORONARY BYPASS GRAFT OF NATIVE HEART WITHOUT ANGINA PECTORIS: Primary | ICD-10-CM

## 2024-03-11 PROCEDURE — 93280 PM DEVICE PROGR EVAL DUAL: CPT | Performed by: INTERNAL MEDICINE

## 2024-03-11 PROCEDURE — 99214 OFFICE O/P EST MOD 30 MIN: CPT | Performed by: INTERNAL MEDICINE

## 2024-03-11 NOTE — PROGRESS NOTES
[facility] Cardiology  Office visit  Lizandro Culp  1945  [phone]  There is no work phone number on file.    VISIT DATE:  3/11/2024    PCP: Dorcas Figueroa MD  [address] [facility] [address]    CC:  Chief Complaint   Patient presents with    Coronary Artery Disease       Previous cardiac studies and procedures:  October 3 2002: Cardiac catheterization: RCA PCI/stenting  October 4, 2002 LAD stent.  October 15 2002 left circumflex stent     March 2017 LAD stent, left circumflex stent, RCA stent.     April 2017: Stent RCA.     August 2019   Saint Thong dual-chamber pacemaker  Myocardial perfusion imaging: EF 55%, mild inferolateral ischemia.     July 2020 TTE: EF 60%, mild diastolic dysfunction, mitral valve prolapse, posterior leaflet, mild MR.    June 2023 LHC    95% mid LAD Denovo stenosis treated 3.5 x 18 Xience EES    70% proximal LAD ISR treated 4.0 x 15 Xience EES.    OCT showed MLA greater than 8 mm² throughout both stents.    Diffuse 50% ISR mid left circumflex    Widely patent RCA stent    Normal LVEF    ASSESSMENT:   Diagnosis Plan   1. Coronary artery disease involving coronary bypass graft of native heart without angina pectoris        2. SSS (sick sinus syndrome)            Device interrogation:  Saint Thong dual-chamber pacemaker  Normal interrogation, see scanned sheet.  No significant arrhythmia.    PLAN:  Coronary artery disease: Currently stable and asymptomatic.  Continue dual antiplatelet therapy.  Statin intolerant.  Previously has experienced hypotension on clopidogrel, if he has recurrent episodes will switch either to Effient or Brilinta     Sick sinus syndrome: Continue routine follow-up in device clinic.     Statin intolerant and refusing alternative agents.    Subjective  Interval assessment:   Blood pressures running less than 130/80 mmHg.  Denies chest pain or dyspnea.  Compliant with medical therapy.    Initial evaluation: 76-year-old  "gentleman with a history of coronary artery disease requiring recurrent multivessel PCI.  Has been stable recently.  Has stable shortness of breath in a class II pattern.  Denies chest discomfort.  Blood pressures running less than 130/80 mmHg.  Has not required any sublingual nitroglycerin recently.  Taking aspirin on a regular basis.  Developed flulike symptoms on multiple statins agents.  Discussed potential options such as PCSK9 inhibitors, he is not interested in trying an alternative agent at this time.  Reviewed most recent laboratory evaluation.    PHYSICAL EXAMINATION:  Vitals:    03/11/24 1344   BP: 110/60   BP Location: Right arm   Patient Position: Sitting   Pulse: 68   SpO2: 96%   Weight: 91.9 kg (202 lb 9.6 oz)   Height: 180.3 cm (71\")     General Appearance:    Alert, cooperative, no distress, appears stated age   Head:    Normocephalic, without obvious abnormality, atraumatic   Eyes:    conjunctiva/corneas clear   Nose:   Nares normal, septum midline, mucosa normal, no drainage   Throat:   Lips, teeth and gums normal   Neck:   Supple, symmetrical, trachea midline, no carotid    bruit or JVD   Lungs:     Clear to auscultation bilaterally, respirations unlabored   Chest Wall:    No tenderness or deformity    Heart:    Regular rate and rhythm, S1 and S2 normal, no murmur, rub   or gallop, normal carotid impulse bilaterally without bruit.   Abdomen:     Soft, non-tender   Extremities:   Extremities normal, atraumatic, no cyanosis or edema   Pulses:   2+ and symmetric all extremities   Skin:   Skin color, texture, turgor normal, no rashes or lesions       Diagnostic Data:  Procedures  Lab Results   Component Value Date    TRIG 49 06/02/2023    HDL 56 06/02/2023     Lab Results   Component Value Date    GLUCOSE 125 (H) 12/15/2023    BUN 19 12/15/2023    CREATININE 0.86 12/15/2023     (L) 12/15/2023    K 3.9 12/15/2023     12/15/2023    CO2 22.5 12/15/2023     Lab Results   Component Value Date "    HGBA1C 6.20 (H) 2023     Lab Results   Component Value Date    WBC 11.60 (H) 12/15/2023    HGB 13.0 12/15/2023    HCT 39.7 12/15/2023     (L) 12/15/2023       Allergies  Allergies   Allergen Reactions    Lipitor [Atorvastatin] Provider Review Needed     Flu like symptoms    Oatmeal Other (See Comments)     Foggy feeling, swollen glands, myalgia       Current Medications    Current Outpatient Medications:     Ascorbic Acid (VITAMIN C PO), Take 1,000 mg by mouth Daily., Disp: , Rfl:     Aspirin 81 MG capsule, Take 1 capsule every day by oral route., Disp: , Rfl:     chlorhexidine (PERIDEX) 0.12 % solution, SWISH AND SPIT IN MOUTH AT NIGHT AS DIRECTED, Disp: , Rfl:     Cholecalciferol (VITAMIN D3 PO), Take 2,000 mg by mouth Daily., Disp: , Rfl:     DIGESTIVE ENZYMES PO, Take 1 tablet by mouth 2 (Two) Times a Day., Disp: , Rfl:     KRILL OIL PO, Take 2 tablets by mouth Daily., Disp: , Rfl:     Misc Natural Products (PROSTATE SUPPORT PO), Take 1 tablet by mouth 2 (Two) Times a Day. Super Beta Prostate, Disp: , Rfl:     nitroglycerin (NITROSTAT) 0.4 MG SL tablet, Place 1 tablet under the tongue Every 5 (Five) Minutes As Needed for Chest Pain. Take no more than 3 doses in 15 minutes., Disp: 25 tablet, Rfl: 3    Probiotic Product (Probiotic Daily) capsule, Take 1 tablet by mouth Daily., Disp: , Rfl:     Saw Palmetto, Serenoa repens, (SAW PALMETTO PO), Take 1 tablet by mouth Daily., Disp: , Rfl:     tamsulosin (FLOMAX) 0.4 MG capsule 24 hr capsule, Take 1 capsule by mouth Every Night. (Patient taking differently: Take 1 capsule by mouth 2 (Two) Times a Day.), Disp: 30 capsule, Rfl: 11          ROS  ROS      SOCIAL HX  Social History     Socioeconomic History    Marital status:    Tobacco Use    Smoking status: Former     Current packs/day: 0.00     Types: Cigarettes     Start date:      Quit date:      Years since quittin.2     Passive exposure: Past    Smokeless tobacco: Never   Vaping  Use    Vaping status: Never Used   Substance and Sexual Activity    Alcohol use: Yes     Alcohol/week: 1.0 standard drink of alcohol     Types: 1 Glasses of wine per week     Comment: one drink per day    Drug use: Never    Sexual activity: Defer       FAMILY HX  Family History   Problem Relation Age of Onset    Stroke Mother     Parkinsonism Father     Prostate cancer Brother     Stroke Other     Colon cancer Neg Hx              Ren Sneed III, MD, FACC

## 2024-03-15 RX ORDER — NITROGLYCERIN 0.4 MG/1
0.4 TABLET SUBLINGUAL
Qty: 25 TABLET | Refills: 0 | Status: SHIPPED | OUTPATIENT
Start: 2024-03-15

## 2024-05-31 ENCOUNTER — HOSPITAL ENCOUNTER (OUTPATIENT)
Dept: RADIATION ONCOLOGY | Facility: HOSPITAL | Age: 79
Setting detail: RADIATION/ONCOLOGY SERIES
Discharge: HOME | End: 2024-05-31
Payer: MEDICARE

## 2024-05-31 ENCOUNTER — OFFICE VISIT (OUTPATIENT)
Dept: RADIATION ONCOLOGY | Facility: HOSPITAL | Age: 79
End: 2024-05-31
Payer: MEDICARE

## 2024-05-31 VITALS
DIASTOLIC BLOOD PRESSURE: 76 MMHG | BODY MASS INDEX: 28.35 KG/M2 | OXYGEN SATURATION: 98 % | RESPIRATION RATE: 16 BRPM | SYSTOLIC BLOOD PRESSURE: 129 MMHG | WEIGHT: 203.3 LBS | HEART RATE: 80 BPM | TEMPERATURE: 97.2 F

## 2024-05-31 DIAGNOSIS — C61 PROSTATE CANCER: Primary | ICD-10-CM

## 2024-05-31 PROCEDURE — G0463 HOSPITAL OUTPT CLINIC VISIT: HCPCS

## 2024-05-31 NOTE — PROGRESS NOTES
FOLLOW UP NOTE    PATIENT:                                                      Lizandro Culp  MEDICAL RECORD #:                        [id number]  :                                                          1945  COMPLETION DATE:   10/20/2023  DIAGNOSIS:     Prostate cancer  - Stage IIB (cT2c, cN0, cM0, PSA: 6, Grade Group: 2)      BRIEF HISTORY:    Routine follow-up visit for low intermediate risk prostate cancer.  He underwent definitive treatment with a course of CyberKnife stereotactic body radiotherapy, completing 10/20/2023.  He tolerated treatment well.  He continues to note stable, moderate BPH outflow obstructive symptoms that are unchanged from prior to treatment.  He endorses an IPSS score of 19 which is stable, noting urinary frequency and intermittency as his predominant symptoms.   He denies dysuria, gross hematuria, or urinary incontinence.  He recently decreased Flomax from BID dosing to once in the mornings as he felt this may be contributing to insomnia.  He has not noticed an improvement in falling asleep but notes the medication change has only been a few days.   He reports bowel function is normal.  He denies acute GI complaints.  He continues with chronic ED but is not interested in ED medication at this time.  He denies unexplained weight loss or new/progressive bony pain.  He has had several basal cell carcinomas of the skin removed recently.  His PSA post-treatment decreased to a value of 1.8 ng/mL on 2024.    IPSS Questionnaire (AUA-7):  Over the past month…    1)  Incomplete Emptying  How often have you had a sensation of not emptying your bladder?  3 - About half the time   2)  Frequency  How often have you had to urinate less than every two hours? 4 - More than half the time   3)  Intermittency  How often have you found you stopped and started again several times when you urinated?  4 - More than half the time   4) Urgency  How often have you found it difficult to  postpone urination?  1 - Less than 1 time in 5   5) Weak Stream  How often have you had a weak urinary stream?  3 - About half the time   6) Straining  How often have you had to push or strain to begin urination?  3 - About half the time   7) Nocturia  How many times did you typically get up at night to urinate?  2 - 2 times   Total Score:  19       Quality of life due to urinary symptoms:  If you were to spend the rest of your life with your urinary condition the way it is now, how would you feel about that? 4-Mostly Dissatisfied   Urine Leakage (Incontinence) 0-No Leakage     Sexual Health Inventory  Current Status    1)  How do you rate your confidence that you could achieve and keep an erection? 1-Very Low   2) When you had erections with sexual stimulation, how often were your erections hard enough for penetration (entering your partner)? 0-No sexual activity   3)  During sexual intercourse, how often were you able to maintain your erection after you had penetrated (entered) into your partner? 0-Did not attempt intercourse   4) During sexual intercourse, how difficult was it to maintain your erection to completion of intercourse? 0-Did not attempt intercourse   5) When you attempted sexual intercourse, how often was it satisfactory to you? 0-No sexual activity   Total Score: 1       Bowel Health Inventory  Current Status: 0-No problems, no rectal bleeding, no discharge, less then 5 bowel movements a day         MEDICATIONS: Medication reconciliation for the patient was reviewed and confirmed in the electronic medical record.    Review of Systems   Constitutional:  Positive for fatigue.   HENT:   Positive for hearing loss.    Genitourinary:  Positive for frequency and nocturia.    All other systems reviewed and are negative.          KPS 90%      Physical Exam  Vitals and nursing note reviewed.   Constitutional:       General: He is not in acute distress.     Appearance: He is well-developed.   HENT:      Head:  Normocephalic and atraumatic.   Eyes:      Conjunctiva/sclera: Conjunctivae normal.      Pupils: Pupils are equal, round, and reactive to light.   Cardiovascular:      Rate and Rhythm: Normal rate and regular rhythm.   Pulmonary:      Effort: Pulmonary effort is normal.      Breath sounds: Normal breath sounds.   Genitourinary:     Comments: Patient defers KENJI, reports upcoming appt with Dr. Ashley 6/11/2024  Musculoskeletal:         General: Normal range of motion.      Cervical back: Normal range of motion and neck supple.   Skin:     General: Skin is warm and dry.   Neurological:      Mental Status: He is alert and oriented to person, place, and time.   Psychiatric:         Behavior: Behavior normal.         Thought Content: Thought content normal.         Judgment: Judgment normal.         VITAL SIGNS:   Vitals:    05/31/24 1009   BP: 129/76   Pulse: 80   Resp: 16   Temp: 97.2 °F (36.2 °C)   TempSrc: Temporal   SpO2: 98%   Weight: 92.2 kg (203 lb 4.8 oz)   PainSc: 0-No pain           LABORATORY:  PSA 2/6/2024 = 1.8 ng/ml    The following portions of the patient's history were reviewed and updated as appropriate: allergies, current medications, past family history, past medical history, past social history, past surgical history and problem list.         Diagnoses and all orders for this visit:    1. Prostate cancer (Primary)    Other orders  -     LABS SCANNED         IMPRESSION:  Prostate cancer, [place]'s 3+4=7, clinical stage IIB (T2c, N0, M0), PSA 6.033 ng/ml.    7 months status post CyberKnife SBRT.  He tolerated treatment well.  He continues with stable lower urinary tract symptoms, managed now with once daily Flomax.   Patient defers KENJI today, reports upcoming visit with his urologist.  He has had a very good, early biochemical response to treatment with PSA reduction to a value of 1.8 ng/ml.  We again reviewed follow-up intervals, biannual PSA monitoring, and expectations for response to treatment,  including typical timeline for PSA to hopefully reach target christianne value of <0.5 ng/ml.      Continue routine dermatologic follow up for skin checks.  Trial melatonin 3 mg qHS and notify PCP if ongoing issues with insomnia.        RECOMMENDATIONS:  Continues routine urologic surveillance under the care of Dr. Ashley, with follow-up and repeat PSA scheduled 6/11/2024.  Return to clinic in 1 year or sooner as needed.    Return in about 1 year (around 5/31/2025) for Office Visit.    ROMAIN Calero      I spent a total of 30 minutes on today's visit, with more than 15 minutes in direct face to face communication, and the remainder of the time spent in reviewing the relevant history, records, available imaging, and for documentation.

## 2024-09-02 ENCOUNTER — HOSPITAL ENCOUNTER (OUTPATIENT)
Facility: HOSPITAL | Age: 79
Setting detail: OBSERVATION
Discharge: HOME | End: 2024-09-03
Attending: STUDENT IN AN ORGANIZED HEALTH CARE EDUCATION/TRAINING PROGRAM | Admitting: STUDENT IN AN ORGANIZED HEALTH CARE EDUCATION/TRAINING PROGRAM
Payer: MEDICARE

## 2024-09-02 ENCOUNTER — APPOINTMENT (OUTPATIENT)
Dept: CT IMAGING | Facility: HOSPITAL | Age: 79
End: 2024-09-02
Payer: MEDICARE

## 2024-09-02 ENCOUNTER — APPOINTMENT (OUTPATIENT)
Dept: GENERAL RADIOLOGY | Facility: HOSPITAL | Age: 79
End: 2024-09-02
Payer: MEDICARE

## 2024-09-02 DIAGNOSIS — R20.0 NUMBNESS AND TINGLING IN LEFT ARM: Primary | ICD-10-CM

## 2024-09-02 DIAGNOSIS — R20.2 NUMBNESS AND TINGLING IN LEFT ARM: Primary | ICD-10-CM

## 2024-09-02 PROBLEM — R42 DIZZY: Status: ACTIVE | Noted: 2024-09-02

## 2024-09-02 LAB
ALBUMIN SERPL-MCNC: 4.3 G/DL (ref 3.5–5.2)
ALBUMIN/GLOB SERPL: 1.5 G/DL
ALP SERPL-CCNC: 86 U/L (ref 39–117)
ALT SERPL W P-5'-P-CCNC: 15 U/L (ref 1–41)
ANION GAP SERPL CALCULATED.3IONS-SCNC: 8.9 MMOL/L (ref 5–15)
AST SERPL-CCNC: 16 U/L (ref 1–40)
BASOPHILS # BLD AUTO: 0.02 10*3/MM3 (ref 0–0.2)
BASOPHILS NFR BLD AUTO: 0.4 % (ref 0–1.5)
BILIRUB SERPL-MCNC: 0.5 MG/DL (ref 0–1.2)
BILIRUB UR QL STRIP: NEGATIVE
BUN SERPL-MCNC: 19 MG/DL (ref 8–23)
BUN/CREAT SERPL: 21.1 (ref 7–25)
CALCIUM SPEC-SCNC: 9.1 MG/DL (ref 8.6–10.5)
CHLORIDE SERPL-SCNC: 105 MMOL/L (ref 98–107)
CLARITY UR: CLEAR
CO2 SERPL-SCNC: 25.1 MMOL/L (ref 22–29)
COLOR UR: YELLOW
CREAT SERPL-MCNC: 0.9 MG/DL (ref 0.76–1.27)
CRP SERPL-MCNC: <0.3 MG/DL (ref 0–0.5)
D-LACTATE SERPL-SCNC: 1.3 MMOL/L (ref 0.5–2)
DEPRECATED RDW RBC AUTO: 47.9 FL (ref 37–54)
EGFRCR SERPLBLD CKD-EPI 2021: 87.4 ML/MIN/1.73
EOSINOPHIL # BLD AUTO: 0.16 10*3/MM3 (ref 0–0.4)
EOSINOPHIL NFR BLD AUTO: 3.3 % (ref 0.3–6.2)
ERYTHROCYTE [DISTWIDTH] IN BLOOD BY AUTOMATED COUNT: 13.4 % (ref 12.3–15.4)
FLUAV RNA RESP QL NAA+PROBE: NOT DETECTED
FLUBV RNA RESP QL NAA+PROBE: NOT DETECTED
GLOBULIN UR ELPH-MCNC: 2.9 GM/DL
GLUCOSE BLDC GLUCOMTR-MCNC: 95 MG/DL (ref 70–130)
GLUCOSE SERPL-MCNC: 98 MG/DL (ref 65–99)
GLUCOSE UR STRIP-MCNC: NEGATIVE MG/DL
HCT VFR BLD AUTO: 41.6 % (ref 37.5–51)
HGB BLD-MCNC: 13.9 G/DL (ref 13–17.7)
HGB UR QL STRIP.AUTO: NEGATIVE
IMM GRANULOCYTES # BLD AUTO: 0.03 10*3/MM3 (ref 0–0.05)
IMM GRANULOCYTES NFR BLD AUTO: 0.6 % (ref 0–0.5)
INR PPP: 0.97 (ref 0.9–1.1)
KETONES UR QL STRIP: NEGATIVE
LEUKOCYTE ESTERASE UR QL STRIP.AUTO: NEGATIVE
LIPASE SERPL-CCNC: 16 U/L (ref 13–60)
LYMPHOCYTES # BLD AUTO: 1.33 10*3/MM3 (ref 0.7–3.1)
LYMPHOCYTES NFR BLD AUTO: 27.4 % (ref 19.6–45.3)
MAGNESIUM SERPL-MCNC: 2 MG/DL (ref 1.6–2.4)
MCH RBC QN AUTO: 32.4 PG (ref 26.6–33)
MCHC RBC AUTO-ENTMCNC: 33.4 G/DL (ref 31.5–35.7)
MCV RBC AUTO: 97 FL (ref 79–97)
MONOCYTES # BLD AUTO: 0.42 10*3/MM3 (ref 0.1–0.9)
MONOCYTES NFR BLD AUTO: 8.6 % (ref 5–12)
NEUTROPHILS NFR BLD AUTO: 2.9 10*3/MM3 (ref 1.7–7)
NEUTROPHILS NFR BLD AUTO: 59.7 % (ref 42.7–76)
NITRITE UR QL STRIP: NEGATIVE
NRBC BLD AUTO-RTO: 0 /100 WBC (ref 0–0.2)
NT-PROBNP SERPL-MCNC: 46.3 PG/ML (ref 0–1800)
PH UR STRIP.AUTO: 7 [PH] (ref 5–8)
PLATELET # BLD AUTO: 165 10*3/MM3 (ref 140–450)
PMV BLD AUTO: 11 FL (ref 6–12)
POTASSIUM SERPL-SCNC: 4.3 MMOL/L (ref 3.5–5.2)
PROCALCITONIN SERPL-MCNC: 0.03 NG/ML (ref 0–0.25)
PROT SERPL-MCNC: 7.2 G/DL (ref 6–8.5)
PROT UR QL STRIP: NEGATIVE
PROTHROMBIN TIME: 13.4 SECONDS (ref 12.3–15.1)
RBC # BLD AUTO: 4.29 10*6/MM3 (ref 4.14–5.8)
SARS-COV-2 RNA RESP QL NAA+PROBE: NOT DETECTED
SODIUM SERPL-SCNC: 139 MMOL/L (ref 136–145)
SP GR UR STRIP: 1.02 (ref 1–1.03)
TROPONIN T SERPL HS-MCNC: 8 NG/L
UROBILINOGEN UR QL STRIP: NORMAL
WBC NRBC COR # BLD AUTO: 4.86 10*3/MM3 (ref 3.4–10.8)

## 2024-09-02 PROCEDURE — 99222 1ST HOSP IP/OBS MODERATE 55: CPT | Performed by: NURSE PRACTITIONER

## 2024-09-02 PROCEDURE — 36415 COLL VENOUS BLD VENIPUNCTURE: CPT

## 2024-09-02 PROCEDURE — 84145 PROCALCITONIN (PCT): CPT | Performed by: STUDENT IN AN ORGANIZED HEALTH CARE EDUCATION/TRAINING PROGRAM

## 2024-09-02 PROCEDURE — G0378 HOSPITAL OBSERVATION PER HR: HCPCS

## 2024-09-02 PROCEDURE — 87636 SARSCOV2 & INF A&B AMP PRB: CPT | Performed by: STUDENT IN AN ORGANIZED HEALTH CARE EDUCATION/TRAINING PROGRAM

## 2024-09-02 PROCEDURE — 99204 OFFICE O/P NEW MOD 45 MIN: CPT | Performed by: PSYCHIATRY & NEUROLOGY

## 2024-09-02 PROCEDURE — 70496 CT ANGIOGRAPHY HEAD: CPT

## 2024-09-02 PROCEDURE — 85025 COMPLETE CBC W/AUTO DIFF WBC: CPT | Performed by: STUDENT IN AN ORGANIZED HEALTH CARE EDUCATION/TRAINING PROGRAM

## 2024-09-02 PROCEDURE — 80053 COMPREHEN METABOLIC PANEL: CPT | Performed by: STUDENT IN AN ORGANIZED HEALTH CARE EDUCATION/TRAINING PROGRAM

## 2024-09-02 PROCEDURE — 99285 EMERGENCY DEPT VISIT HI MDM: CPT

## 2024-09-02 PROCEDURE — 70450 CT HEAD/BRAIN W/O DYE: CPT

## 2024-09-02 PROCEDURE — 82948 REAGENT STRIP/BLOOD GLUCOSE: CPT

## 2024-09-02 PROCEDURE — 84484 ASSAY OF TROPONIN QUANT: CPT | Performed by: STUDENT IN AN ORGANIZED HEALTH CARE EDUCATION/TRAINING PROGRAM

## 2024-09-02 PROCEDURE — 0042T HC CT CEREBRAL PERFUSION W/WO CONTRAST: CPT

## 2024-09-02 PROCEDURE — 70498 CT ANGIOGRAPHY NECK: CPT

## 2024-09-02 PROCEDURE — 85610 PROTHROMBIN TIME: CPT | Performed by: STUDENT IN AN ORGANIZED HEALTH CARE EDUCATION/TRAINING PROGRAM

## 2024-09-02 PROCEDURE — 83735 ASSAY OF MAGNESIUM: CPT | Performed by: STUDENT IN AN ORGANIZED HEALTH CARE EDUCATION/TRAINING PROGRAM

## 2024-09-02 PROCEDURE — 93005 ELECTROCARDIOGRAM TRACING: CPT | Performed by: STUDENT IN AN ORGANIZED HEALTH CARE EDUCATION/TRAINING PROGRAM

## 2024-09-02 PROCEDURE — 83690 ASSAY OF LIPASE: CPT | Performed by: STUDENT IN AN ORGANIZED HEALTH CARE EDUCATION/TRAINING PROGRAM

## 2024-09-02 PROCEDURE — 25810000003 LACTATED RINGERS SOLUTION: Performed by: STUDENT IN AN ORGANIZED HEALTH CARE EDUCATION/TRAINING PROGRAM

## 2024-09-02 PROCEDURE — 83880 ASSAY OF NATRIURETIC PEPTIDE: CPT | Performed by: STUDENT IN AN ORGANIZED HEALTH CARE EDUCATION/TRAINING PROGRAM

## 2024-09-02 PROCEDURE — 86140 C-REACTIVE PROTEIN: CPT | Performed by: STUDENT IN AN ORGANIZED HEALTH CARE EDUCATION/TRAINING PROGRAM

## 2024-09-02 PROCEDURE — 25510000001 IOPAMIDOL 61 % SOLUTION: Performed by: STUDENT IN AN ORGANIZED HEALTH CARE EDUCATION/TRAINING PROGRAM

## 2024-09-02 PROCEDURE — 71045 X-RAY EXAM CHEST 1 VIEW: CPT

## 2024-09-02 PROCEDURE — 83605 ASSAY OF LACTIC ACID: CPT | Performed by: STUDENT IN AN ORGANIZED HEALTH CARE EDUCATION/TRAINING PROGRAM

## 2024-09-02 PROCEDURE — 81003 URINALYSIS AUTO W/O SCOPE: CPT | Performed by: STUDENT IN AN ORGANIZED HEALTH CARE EDUCATION/TRAINING PROGRAM

## 2024-09-02 RX ORDER — TAMSULOSIN HYDROCHLORIDE 0.4 MG/1
0.4 CAPSULE ORAL NIGHTLY
Status: DISCONTINUED | OUTPATIENT
Start: 2024-09-02 | End: 2024-09-03 | Stop reason: HOSPADM

## 2024-09-02 RX ORDER — ACETAMINOPHEN 325 MG/1
650 TABLET ORAL EVERY 4 HOURS PRN
Status: DISCONTINUED | OUTPATIENT
Start: 2024-09-02 | End: 2024-09-03 | Stop reason: HOSPADM

## 2024-09-02 RX ORDER — BISACODYL 10 MG
10 SUPPOSITORY, RECTAL RECTAL DAILY PRN
Status: DISCONTINUED | OUTPATIENT
Start: 2024-09-02 | End: 2024-09-03 | Stop reason: HOSPADM

## 2024-09-02 RX ORDER — NITROGLYCERIN 0.4 MG/1
0.4 TABLET SUBLINGUAL
Status: DISCONTINUED | OUTPATIENT
Start: 2024-09-02 | End: 2024-09-03 | Stop reason: HOSPADM

## 2024-09-02 RX ORDER — SODIUM CHLORIDE 9 MG/ML
40 INJECTION, SOLUTION INTRAVENOUS AS NEEDED
Status: DISCONTINUED | OUTPATIENT
Start: 2024-09-02 | End: 2024-09-03 | Stop reason: HOSPADM

## 2024-09-02 RX ORDER — IOPAMIDOL 612 MG/ML
100 INJECTION, SOLUTION INTRAVASCULAR
Status: COMPLETED | OUTPATIENT
Start: 2024-09-02 | End: 2024-09-02

## 2024-09-02 RX ORDER — ONDANSETRON 4 MG/1
4 TABLET, ORALLY DISINTEGRATING ORAL EVERY 6 HOURS PRN
Status: DISCONTINUED | OUTPATIENT
Start: 2024-09-02 | End: 2024-09-03 | Stop reason: HOSPADM

## 2024-09-02 RX ORDER — ONDANSETRON 2 MG/ML
4 INJECTION INTRAMUSCULAR; INTRAVENOUS EVERY 6 HOURS PRN
Status: DISCONTINUED | OUTPATIENT
Start: 2024-09-02 | End: 2024-09-03 | Stop reason: HOSPADM

## 2024-09-02 RX ORDER — SODIUM CHLORIDE 0.9 % (FLUSH) 0.9 %
10 SYRINGE (ML) INJECTION EVERY 12 HOURS SCHEDULED
Status: DISCONTINUED | OUTPATIENT
Start: 2024-09-02 | End: 2024-09-03 | Stop reason: HOSPADM

## 2024-09-02 RX ORDER — SODIUM CHLORIDE 0.9 % (FLUSH) 0.9 %
10 SYRINGE (ML) INJECTION AS NEEDED
Status: DISCONTINUED | OUTPATIENT
Start: 2024-09-02 | End: 2024-09-03 | Stop reason: HOSPADM

## 2024-09-02 RX ORDER — BISACODYL 5 MG/1
5 TABLET, DELAYED RELEASE ORAL DAILY PRN
Status: DISCONTINUED | OUTPATIENT
Start: 2024-09-02 | End: 2024-09-03 | Stop reason: HOSPADM

## 2024-09-02 RX ORDER — ACETAMINOPHEN 160 MG/5ML
650 SOLUTION ORAL EVERY 4 HOURS PRN
Status: DISCONTINUED | OUTPATIENT
Start: 2024-09-02 | End: 2024-09-03 | Stop reason: HOSPADM

## 2024-09-02 RX ORDER — ASPIRIN 81 MG/1
81 TABLET, CHEWABLE ORAL DAILY
Status: DISCONTINUED | OUTPATIENT
Start: 2024-09-02 | End: 2024-09-03 | Stop reason: HOSPADM

## 2024-09-02 RX ORDER — ACETAMINOPHEN 650 MG/1
650 SUPPOSITORY RECTAL EVERY 4 HOURS PRN
Status: DISCONTINUED | OUTPATIENT
Start: 2024-09-02 | End: 2024-09-03 | Stop reason: HOSPADM

## 2024-09-02 RX ORDER — ALUMINA, MAGNESIA, AND SIMETHICONE 2400; 2400; 240 MG/30ML; MG/30ML; MG/30ML
7.5 SUSPENSION ORAL EVERY 4 HOURS PRN
Status: DISCONTINUED | OUTPATIENT
Start: 2024-09-02 | End: 2024-09-03 | Stop reason: HOSPADM

## 2024-09-02 RX ORDER — POLYETHYLENE GLYCOL 3350 17 G/17G
17 POWDER, FOR SOLUTION ORAL DAILY PRN
Status: DISCONTINUED | OUTPATIENT
Start: 2024-09-02 | End: 2024-09-03 | Stop reason: HOSPADM

## 2024-09-02 RX ORDER — ASPIRIN 300 MG/1
300 SUPPOSITORY RECTAL DAILY
Status: DISCONTINUED | OUTPATIENT
Start: 2024-09-02 | End: 2024-09-03 | Stop reason: HOSPADM

## 2024-09-02 RX ORDER — TAMSULOSIN HYDROCHLORIDE 0.4 MG/1
0.4 CAPSULE ORAL DAILY
Status: DISCONTINUED | OUTPATIENT
Start: 2024-09-02 | End: 2024-09-02

## 2024-09-02 RX ORDER — IOPAMIDOL 612 MG/ML
50 INJECTION, SOLUTION INTRAVASCULAR
Status: COMPLETED | OUTPATIENT
Start: 2024-09-02 | End: 2024-09-02

## 2024-09-02 RX ORDER — AMOXICILLIN 250 MG
2 CAPSULE ORAL 2 TIMES DAILY PRN
Status: DISCONTINUED | OUTPATIENT
Start: 2024-09-02 | End: 2024-09-03 | Stop reason: HOSPADM

## 2024-09-02 RX ADMIN — SODIUM CHLORIDE, POTASSIUM CHLORIDE, SODIUM LACTATE AND CALCIUM CHLORIDE 1000 ML: 600; 310; 30; 20 INJECTION, SOLUTION INTRAVENOUS at 11:37

## 2024-09-02 RX ADMIN — Medication 10 ML: at 20:48

## 2024-09-02 RX ADMIN — IOPAMIDOL 40 ML: 612 INJECTION, SOLUTION INTRAVENOUS at 12:44

## 2024-09-02 RX ADMIN — IOPAMIDOL 100 ML: 612 INJECTION, SOLUTION INTRAVENOUS at 12:44

## 2024-09-02 NOTE — ED PROVIDER NOTES
Subjective:  History of Present Illness:    Patient is a 79-year-old male history of bradycardia status post pacemaker, CAD, hypothyroidism, prostate cancer, sick sinus syndrome who presents today with dizziness.  Reports that he was walking through [place] when he became very lightheaded today.  Also had facial numbness.  No focal weakness.  Patient ambulatory into the emergency department.  Denies any vertigo.  No fevers.  Denies chest pain.  Endorses some headache but denies any head trauma.  No nausea vomiting or diarrhea.  Denies abdominal pain.  No leg swelling or leg pain.      Nurses Notes reviewed and agree, including vitals, allergies, social history and prior medical history.     REVIEW OF SYSTEMS: All systems reviewed and not pertinent unless noted.  Review of Systems   Constitutional:  Positive for activity change and fatigue. Negative for appetite change, chills and fever.   HENT:  Negative for congestion, sinus pressure, sneezing and trouble swallowing.    Eyes:  Negative for discharge and itching.   Respiratory:  Negative for cough and shortness of breath.    Cardiovascular:  Negative for chest pain and palpitations.   Gastrointestinal:  Negative for abdominal distention, abdominal pain, diarrhea, nausea and vomiting.   Endocrine: Negative for cold intolerance and heat intolerance.   Genitourinary:  Negative for decreased urine volume, dysuria and urgency.   Musculoskeletal:  Negative for gait problem, neck pain and neck stiffness.   Skin:  Negative for color change and rash.   Allergic/Immunologic: Negative for immunocompromised state.   Neurological:  Positive for dizziness, light-headedness and headaches. Negative for syncope and facial asymmetry.   Hematological:  Negative for adenopathy.   Psychiatric/Behavioral:  Negative for self-injury and suicidal ideas.        Past Medical History:   Diagnosis Date    Bradycardia     s/p pacemaker    CAD (coronary artery disease)     Hearing difficulty      "Heart attack 2017    Hiatal hernia     Hyperthyroidism     Orthostatic hypotension     Prostate cancer     Refusal of statin medication by patient     Refused pneumococcal vaccination     Sick sinus syndrome     Skin cancer     basal cell cancer on right ear    Snores     Trigger finger     Left thumb       Allergies:    Lipitor [atorvastatin] and Oatmeal      Past Surgical History:   Procedure Laterality Date    BACK SURGERY      fusion/deiscectomy    CARDIAC CATHETERIZATION      2023 PER DR. LAWRENCE    CARDIAC CATHETERIZATION N/A 2023    Procedure: Left Heart Cath;  Surgeon: Nain Lawrence MD;  Location: [facility] CATH INVASIVE LOCATION;  Service: Cardiovascular;  Laterality: N/A;  Please schedule with Interventionalist.    COLONOSCOPY          CORONARY ANGIOPLASTY  2003    x6 stents    CORONARY ANGIOPLASTY WITH STENT PLACEMENT  2017    x 3 stent    CYBERKNIFE  10/20/2023    prostate/SV    INGUINAL HERNIA REPAIR Bilateral     INSERT / REPLACE / REMOVE PACEMAKER      PROSTATE FIDUCIAL MARKER PLACEMENT  2023         Social History     Socioeconomic History    Marital status:    Tobacco Use    Smoking status: Former     Current packs/day: 0.00     Types: Cigarettes     Start date:      Quit date:      Years since quittin.6     Passive exposure: Past    Smokeless tobacco: Never   Vaping Use    Vaping status: Never Used   Substance and Sexual Activity    Alcohol use: Yes     Alcohol/week: 1.0 standard drink of alcohol     Types: 1 Glasses of wine per week     Comment: one drink per day    Drug use: Never    Sexual activity: Defer         Family History   Problem Relation Age of Onset    Stroke Mother     Parkinsonism Father     Prostate cancer Brother     Stroke Other     Colon cancer Neg Hx        Objective  Physical Exam:  /83 (BP Location: Left arm, Patient Position: Lying)   Pulse 60   Temp 97.5 °F (36.4 °C) (Oral)   Resp 16   Ht 180.3 cm (70.98\")   Wt 88.3 kg " (194 lb 10.7 oz)   SpO2 97%   BMI 27.16 kg/m²      Physical Exam  Constitutional:       General: He is not in acute distress.     Appearance: Normal appearance. He is normal weight. He is not ill-appearing.   HENT:      Head: Normocephalic and atraumatic.      Nose: Nose normal. No congestion or rhinorrhea.      Mouth/Throat:      Mouth: Mucous membranes are moist.      Pharynx: Oropharynx is clear.   Eyes:      Extraocular Movements: Extraocular movements intact.      Conjunctiva/sclera: Conjunctivae normal.      Pupils: Pupils are equal, round, and reactive to light.   Cardiovascular:      Rate and Rhythm: Normal rate and regular rhythm.      Pulses: Normal pulses.   Pulmonary:      Effort: Pulmonary effort is normal. No respiratory distress.      Breath sounds: Normal breath sounds.   Abdominal:      General: Abdomen is flat. Bowel sounds are normal. There is no distension.      Palpations: Abdomen is soft.      Tenderness: There is no abdominal tenderness. There is no guarding or rebound.   Musculoskeletal:         General: No swelling or tenderness. Normal range of motion.      Cervical back: Normal range of motion and neck supple. No rigidity or tenderness.      Right lower leg: No edema.      Left lower leg: No edema.   Skin:     General: Skin is warm and dry.      Capillary Refill: Capillary refill takes less than 2 seconds.   Neurological:      General: No focal deficit present.      Mental Status: He is alert and oriented to person, place, and time. Mental status is at baseline.      Cranial Nerves: No cranial nerve deficit.      Sensory: No sensory deficit.      Motor: No weakness.      Coordination: Coordination normal.      Comments: No cranial deficit on my neuroexam, patient with no focal weakness   Psychiatric:         Mood and Affect: Mood normal.         Behavior: Behavior normal.         Thought Content: Thought content normal.         Judgment: Judgment normal.         Procedures    ED  Course:    ED Course as of 09/02/24 1449   Mon Sep 02, 2024   1052 EKG interpreted by me, atrial pacemaker in place with no concerning ST changes noted, rate of 70, abnormal EKG [JE]   1217 NIH score of 1 given different sensation to the left upper extremity, CTAs ordered [JE]   1229 I was called to bedside as patient had began to complain of worsening numbness to his left upper extremity, left side of his face.  No focal weakness and no cranial nerve deficit noted. [JE]      ED Course User Index  [JE] Jose M Begum MD       Lab Results (last 24 hours)       Procedure Component Value Units Date/Time    CBC & Differential [[id number]]  (Abnormal) Collected: 09/02/24 1050    Specimen: Blood Updated: 09/02/24 1105    Narrative:      The following orders were created for panel order CBC & Differential.  Procedure                               Abnormality         Status                     ---------                               -----------         ------                     CBC Auto Differential[[id number]]        Abnormal            Final result                 Please view results for these tests on the individual orders.    Comprehensive Metabolic Panel [[id number]] Collected: 09/02/24 1050    Specimen: Blood Updated: 09/02/24 1119     Glucose 98 mg/dL      BUN 19 mg/dL      Creatinine 0.90 mg/dL      Sodium 139 mmol/L      Potassium 4.3 mmol/L      Chloride 105 mmol/L      CO2 25.1 mmol/L      Calcium 9.1 mg/dL      Total Protein 7.2 g/dL      Albumin 4.3 g/dL      ALT (SGPT) 15 U/L      AST (SGOT) 16 U/L      Alkaline Phosphatase 86 U/L      Total Bilirubin 0.5 mg/dL      Globulin 2.9 gm/dL      A/G Ratio 1.5 g/dL      BUN/Creatinine Ratio 21.1     Anion Gap 8.9 mmol/L      eGFR 87.4 mL/min/1.73     Narrative:      GFR Normal >60  Chronic Kidney Disease <60  Kidney Failure <15    The GFR formula is only valid for adults with stable renal function between ages 18 and 70.    Lipase [[id number]]  (Normal) Collected:  09/02/24 1050    Specimen: Blood Updated: 09/02/24 1119     Lipase 16 U/L     High Sensitivity Troponin T [[id number]]  (Normal) Collected: 09/02/24 1050    Specimen: Blood Updated: 09/02/24 1123     HS Troponin T 8 ng/L     Narrative:      High Sensitive Troponin T Reference Range:  <14.0 ng/L- Negative Female for AMI  <22.0 ng/L- Negative Male for AMI  >=14 - Abnormal Female indicating possible myocardial injury.  >=22 - Abnormal Male indicating possible myocardial injury.   Clinicians would have to utilize clinical acumen, EKG, Troponin, and serial changes to determine if it is an Acute Myocardial Infarction or myocardial injury due to an underlying chronic condition.         BNP [[id number]]  (Normal) Collected: 09/02/24 1050    Specimen: Blood Updated: 09/02/24 1123     proBNP 46.3 pg/mL     Narrative:      This assay is used as an aid in the diagnosis of individuals suspected of having heart failure. It can be used as an aid in the diagnosis of acute decompensated heart failure (ADHF) in patients presenting with signs and symptoms of ADHF to the emergency department (ED). In addition, NT-proBNP of <300 pg/mL indicates ADHF is not likely.    Age Range Result Interpretation  NT-proBNP Concentration (pg/mL:      <50             Positive            >450                   Gray                 300-450                    Negative             <300    50-75           Positive            >900                  Gray                300-900                  Negative            <300      >75             Positive            >1800                  Gray                300-1800                  Negative            <300    C-reactive Protein [[id number]]  (Normal) Collected: 09/02/24 1050    Specimen: Blood Updated: 09/02/24 1150     C-Reactive Protein <0.30 mg/dL     Procalcitonin [[id number]]  (Normal) Collected: 09/02/24 1050    Specimen: Blood Updated: 09/02/24 1128     Procalcitonin 0.03 ng/mL     Narrative:      As a Marker for  "Sepsis (Non-Neonates):    1. <0.5 ng/mL represents a low risk of severe sepsis and/or septic shock.  2. >2 ng/mL represents a high risk of severe sepsis and/or septic shock.    As a Marker for Lower Respiratory Tract Infections that require antibiotic therapy:    PCT on Admission    Antibiotic Therapy       6-12 Hrs later    >0.5                Strongly Recommended  >0.25 - <0.5        Recommended   0.1 - 0.25          Discouraged              Remeasure/reassess PCT  <0.1                Strongly Discouraged     Remeasure/reassess PCT    As 28 day mortality risk marker: \"Change in Procalcitonin Result\" (>80% or <=80%) if Day 0 (or Day 1) and Day 4 values are available. Refer to [email or web address]    Change in PCT <=80%  A decrease of PCT levels below or equal to 80% defines a positive change in PCT test result representing a higher risk for 28-day all-cause mortality of patients diagnosed with severe sepsis for septic shock.    Change in PCT >80%  A decrease of PCT levels of more than 80% defines a negative change in PCT result representing a lower risk for 28-day all-cause mortality of patients diagnosed with severe sepsis or septic shock.       Lactic Acid, Plasma [[id number]]  (Normal) Collected: 09/02/24 1050    Specimen: Blood Updated: 09/02/24 1124     Lactate 1.3 mmol/L     Magnesium [[id number]]  (Normal) Collected: 09/02/24 1050    Specimen: Blood Updated: 09/02/24 1119     Magnesium 2.0 mg/dL     CBC Auto Differential [[id number]]  (Abnormal) Collected: 09/02/24 1050    Specimen: Blood Updated: 09/02/24 1105     WBC 4.86 10*3/mm3      RBC 4.29 10*6/mm3      Hemoglobin 13.9 g/dL      Hematocrit 41.6 %      MCV 97.0 fL      MCH 32.4 pg      MCHC 33.4 g/dL      RDW 13.4 %      RDW-SD 47.9 fl      MPV 11.0 fL      Platelets 165 10*3/mm3      Neutrophil % 59.7 %      Lymphocyte % 27.4 %      Monocyte % 8.6 %      Eosinophil % 3.3 %      Basophil % 0.4 %      Immature Grans % 0.6 %      " Neutrophils, Absolute 2.90 10*3/mm3      Lymphocytes, Absolute 1.33 10*3/mm3      Monocytes, Absolute 0.42 10*3/mm3      Eosinophils, Absolute 0.16 10*3/mm3      Basophils, Absolute 0.02 10*3/mm3      Immature Grans, Absolute 0.03 10*3/mm3      nRBC 0.0 /100 WBC     COVID-19 and FLU A/B PCR, 1 HR TAT - Swab, Nasopharynx [[id number]]  (Normal) Collected: 09/02/24 1053    Specimen: Swab from Nasopharynx Updated: 09/02/24 1120     COVID19 Not Detected     Influenza A PCR Not Detected     Influenza B PCR Not Detected    Narrative:      Fact sheet for providers: https://www.fda.gov/media/067721/download    Fact sheet for patients: https://www.fda.gov/media/823331/download    Test performed by PCR.    Protime-INR [[id number]]  (Normal) Collected: 09/02/24 1235    Specimen: Blood Updated: 09/02/24 1243     Protime 13.4 Seconds      INR 0.97    Narrative:      Suggested INR therapeutic range for stable oral anticoagulant therapy:    Low Intensity therapy:   1.5-2.0  Moderate Intensity therapy:   2.0-3.0  High Intensity therapy:   2.5-4.0    Urinalysis With Culture If Indicated - Urine, Clean Catch [[id number]]  (Normal) Collected: 09/02/24 1248    Specimen: Urine, Clean Catch Updated: 09/02/24 1302     Color, UA Yellow     Appearance, UA Clear     pH, UA 7.0     Specific Gravity, UA 1.023     Glucose, UA Negative     Ketones, UA Negative     Bilirubin, UA Negative     Blood, UA Negative     Protein, UA Negative     Leuk Esterase, UA Negative     Nitrite, UA Negative     Urobilinogen, UA 1.0 E.U./dL    Narrative:      In absence of clinical symptoms, the presence of pyuria, bacteria, and/or nitrites on the urinalysis result does not correlate with infection.  Urine microscopic not indicated.             CT Angiogram Head    Result Date: 9/2/2024  PROCEDURE: CT ANGIOGRAM HEAD-, CT ANGIOGRAM NECK-  HISTORY: Neuro deficit, acute, stroke suspected  TECHNIQUE: Thin section axial CT with IV contrast supplemented with multiplanar 3 D  reconstructions of the head and neck. This study was performed with techniques to keep radiation doses as low as reasonably achievable, (ALARA). Individualized dose reduction techniques using automated exposure control or adjustment of mA and/or kV according to the patient size were employed.  FINDINGS:  Head CT: The ventricles are enlarged indicating atrophy which is prominent for age. There is no evidence of hemorrhage. No masses are identified.  No extra-axial fluid is seen. The sinuses are unremarkable.  CTA:  Aortic arch:  Arch shows no significant narrowing. Great vessel origins are widely patent.  Right carotid: Calcified and noncalcified plaque causes less than 50% stenosis of the right internal carotid artery.  Left carotid: Mild calcified and noncalcified plaque causes less than 50% stenosis.  Vertebrals: The vertebrals are patent. No significant stenosis is present. System is left vertebral dominant. Small plaque identified at the origin of the left vertebral artery. Moderate calcification noted at the origin of the right vertebral artery.  The cranial circulation demonstrates a very hypoplastic or diseased left A1 segment. The left JAVIER fills from the anterior communicating artery.. There is no significant stenosis, aneurysm, or occlusion.      Impression: Less than 50% stenosis bilateral internal carotid arteries.  Hypoplastic or very diseased left A1 segment.    CTDI: DLP:3623.85 mGy.cm   This report was signed and finalized on 9/2/2024 1:13 PM by Ruth Velasquez MD.      CT Angiogram Neck    Result Date: 9/2/2024  PROCEDURE: CT ANGIOGRAM HEAD-, CT ANGIOGRAM NECK-  HISTORY: Neuro deficit, acute, stroke suspected  TECHNIQUE: Thin section axial CT with IV contrast supplemented with multiplanar 3 D reconstructions of the head and neck. This study was performed with techniques to keep radiation doses as low as reasonably achievable, (ALARA). Individualized dose reduction techniques using automated exposure  control or adjustment of mA and/or kV according to the patient size were employed.  FINDINGS:  Head CT: The ventricles are enlarged indicating atrophy which is prominent for age. There is no evidence of hemorrhage. No masses are identified.  No extra-axial fluid is seen. The sinuses are unremarkable.  CTA:  Aortic arch:  Arch shows no significant narrowing. Great vessel origins are widely patent.  Right carotid: Calcified and noncalcified plaque causes less than 50% stenosis of the right internal carotid artery.  Left carotid: Mild calcified and noncalcified plaque causes less than 50% stenosis.  Vertebrals: The vertebrals are patent. No significant stenosis is present. System is left vertebral dominant. Small plaque identified at the origin of the left vertebral artery. Moderate calcification noted at the origin of the right vertebral artery.  The cranial circulation demonstrates a very hypoplastic or diseased left A1 segment. The left JAVIER fills from the anterior communicating artery.. There is no significant stenosis, aneurysm, or occlusion.      Impression: Less than 50% stenosis bilateral internal carotid arteries.  Hypoplastic or very diseased left A1 segment.    CTDI: DLP:3623.85 mGy.cm   This report was signed and finalized on 9/2/2024 1:13 PM by Ruth Velasquez MD.      CT CEREBRAL PERFUSION WITH & WITHOUT CONTRAST    Result Date: 9/2/2024  HEAD CT PERFUSION WITH CONTRAST    9/2/2024 12:29 PM  HISTORY: Neuro deficit, acute, stroke suspected.  COMPARISON: None.  TECHNIQUE: Multiple axial CT images were performed from the foramen magnum to the vertex. Limited dynamic postcontrast images were obtained. Calculations of cerebral blood flow, mean transit time, cerebral blood volume were performed and evaluated. This study was performed with techniques to keep radiation doses as low as reasonably achievable, (ALARA). Individualized dose reduction techniques using automated exposure control or adjustment of mA and/or  kV according to the patient size were employed.  FINDINGS:  rCBF, 30% volume: 0 ml Tmax > 6 sec :  0 ml  Mismatch volume: 0 ml. Mismatch ratio: none  Comments:  There is no evidence or core infarct or ischemic penumbra.      Impression: No evidence of core infarct or ischemic penumbra. No evidence of large vessel occlusion.      CTDI: DLP:3623.85 mGy.cm     This study was performed with techniques to keep radiation doses as low as reasonably achievable (ALARA). Individualized dose reduction techniques using automated exposure control or adjustment of mA and/or kV according to the patient size were employed.   This report was signed and finalized on 9/2/2024 1:00 PM by Ruth Velasquez MD.      CT Head Without Contrast    Result Date: 9/2/2024  PROCEDURE: CT HEAD WO CONTRAST-  HISTORY: History of brain bleed with headache, dizziness, eval intracranial hemorrhage  COMPARISON: None.  TECHNIQUE: Multiple axial CT images were performed from the foramen magnum to the vertex. Individualized dose reduction techniques using automated exposure control or adjustment of mA and/or kV according to the patient size were employed.  FINDINGS: There is moderate, age-appropriate generalized cerebral atrophy. The ventricles are enlarged. There is no evidence of edema or hemorrhage.  No masses are identified. No extra-axial fluid is seen. The paranasal sinuses are unremarkable.      Impression: Atrophy  without acute process.     CTDI: 29.81 mGy DLP:570.98 mGy.cm  This report was signed and finalized on 9/2/2024 11:59 AM by Ruth Velasquez MD.      XR Chest 1 View    Result Date: 9/2/2024  PROCEDURE: XR CHEST 1 VW-  HISTORY: Dizziness, eval pneumonia  COMPARISON: December 15, 2023..  FINDINGS: The heart is normal in size. The lungs are clear. The mediastinum is unremarkable. There is no pneumothorax.  There are no acute osseous abnormalities. Decreased inspiratory effort noted. 2-lead pacemaker again noted. Aortic mural calcifications  identified.      Impression: No acute cardiopulmonary process.      This report was signed and finalized on 9/2/2024 11:40 AM by Ruth Velasquez MD.          MDM      Initial impression of presenting illness: Numbness, lightheadedness    DDX: includes but is not limited to: Gastroenteritis, viral URI, intracranial hemorrhage, CVA    Patient arrives stable with vitals interpreted by myself.     Pertinent features from physical exam: To auscultation, regular rate and rhythm, no murmur, nontender to abdominal palpation, no cranial nerve deficit on CVA exam, patient with equal strength in all 4 extremities.    Initial diagnostic plan: CBC, CMP, UA, troponin, BNP, EKG, chest x-ray, CRP, Pro-Arcadio, lactic acid, magnesium, CT head    Results from initial plan were reviewed and interpreted by me revealing no concern for obvious intracranial hemorrhage    Diagnostic information from other sources: Reviewed past medical records    Interventions / Re-evaluation: Observed in emergency department for several hours, during his ER course, patient began to experience new paresthesias to the left side of his face and left arm.  No focal weakness.  NIH scale of 1.  Given this, ordered stroke imaging which was negative for large vessel occlusion and discussed case with Dr. Zelaya    Results/clinical rationale were discussed with patient at bedside    Consultations/Discussion of results with other physicians: Given my concern for possible CVA as etiology of patient's symptoms discussed with Dr. Zelaya, neurology.  Recommends admission for continued observation, no need for transfer at this time.  Will make medication changes and observe.  Given this, discussed with Dr. Pedroza, hospitalist, and admitted to his service further management.    Disposition plan: Admit  -----        Final diagnoses:   Numbness and tingling in left arm          Jose M Begum MD  09/02/24 [id number]

## 2024-09-02 NOTE — CONSULTS
DOS: 2024  NAME: Lizandro Culp   : 1945  PCP: Dorcas Figueroa MD  CC: New onset numbness in the left side of the body along with lightheadedness when he stood up that occurred about 1.5 hours before the presentation.  Referring MD: Jose M Begum MD    Neurological Problem and Interval History:  79 y.o. right-handed white male with a Hx of coronary artery disease currently having cardiac stents as well as cardiac arrhythmias for which he has been placed on a [employer] pacemaker which is MR conditional as he had an MRI of his prostate performed last year at the [facility] after putting the pacemaker in the safe mode, woke up this morning fine but later on approximately 9 PM started noticing tingling and numbness in the left upper and lower extremities and also lightheadedness with which he presented.  Patient underwent CT angiogram of the head and neck with contrast which did not show any hemodynamically significant stenosis and a CT perfusion scan which is unremarkable.  Because this facility is not capable of doing MRI compatible pacemaker imaging, it was not performed here today.  When I saw the patient through the telemedicine device and noticed he is awake and alert able to give me a good history.  There is no asymmetry of the face noticeable and no speech impediment noted and no visual changes noted.  There was no drift noticeable in the upper and lower extremities and no sensory loss noted anywhere and no extinction on double simultaneous presentation of noxious stimulation bilaterally.  His finger-to-nose coordination and his heel-to-shin testing were normal and he was able to get up and ambulate independently and the Romberg was negative.  As per his wife he is slightly hypertensive although his blood pressure reads only 112/67 but when it was down to 100/68 it was considered normal for him.  It is currently up to 115/79.  Patient is currently taking a baby aspirin but cannot take  any clopidogrel as he gets lightheaded with it and also has blood pressure drops.  He cannot take any kind of statin including Lipitor or Crestor as he gets flulike symptoms and muscle cramps with it.  The patient passed the bedside swallow.  The NIH stroke scale is 0 at this point.  He is not a candidate for any acute thrombolytic therapy or any acute thrombectomy as discussed above.  He will be brought in for observation under the hospitalist.  He is followed by Dr. Ren Sneed in the cardiology division at [facility].    Past Medical/Surgical Hx:  Past Medical History:   Diagnosis Date    Bradycardia     s/p pacemaker    CAD (coronary artery disease)     Hearing difficulty     Heart attack 2017    Hiatal hernia     Hyperthyroidism     Orthostatic hypotension     Prostate cancer     Refusal of statin medication by patient     Refused pneumococcal vaccination     Sick sinus syndrome     Skin cancer     basal cell cancer on right ear    Snores     Trigger finger     Left thumb     Past Surgical History:   Procedure Laterality Date    BACK SURGERY      fusion/deiscectomy    CARDIAC CATHETERIZATION      06/02/2023 PER DR. LAWRENCE    CARDIAC CATHETERIZATION N/A 06/02/2023    Procedure: Left Heart Cath;  Surgeon: Nain Lawrence MD;  Location: [facility] CATH INVASIVE LOCATION;  Service: Cardiovascular;  Laterality: N/A;  Please schedule with Interventionalist.    COLONOSCOPY      2015    CORONARY ANGIOPLASTY  2003    x6 stents    CORONARY ANGIOPLASTY WITH STENT PLACEMENT  2017    x 3 stent    CYBERKNIFE  10/20/2023    prostate/SV    INGUINAL HERNIA REPAIR Bilateral     INSERT / REPLACE / REMOVE PACEMAKER  2020    PROSTATE FIDUCIAL MARKER PLACEMENT  09/12/2023       Review of Systems:    Constitutional: Pleasant gentleman currently resting comfortably in the [place] in no distress.  Cardiovascular: Denies any chest pain or palpitations.  Respiratory: Denies any shortness of breath.  Gastrointestinal: Denies any nausea  or vomiting.  Genitourinary: Denies any bladder incontinence.  Musculoskeletal: Denies any aches and pains in the muscles or joints.  Dermatological: Denies any skin breakdown.  Neurological: No focal neurological deficits at this time with NIH stroke scale of 0.  Psychiatric: Denies any major anxiety or depression.  Ophthalmological: Denies any vision changes.          Medications On Admission  (Not in a hospital admission)      Prior to Admission medications    Medication Sig Start Date End Date Taking? Authorizing Provider   Ascorbic Acid (VITAMIN C PO) Take 1,000 mg by mouth Daily.    Marizol Billy MD   Aspirin 81 MG capsule Take 1 capsule every day by oral route.    Marizol Billy MD   chlorhexidine (PERIDEX) 0.12 % solution SWISH AND SPIT IN MOUTH AT NIGHT AS DIRECTED 5/17/23   Marizol Billy MD   Cholecalciferol (VITAMIN D3 PO) Take 2,000 mg by mouth Daily.    Marizol Billy MD   DIGESTIVE ENZYMES PO Take 1 tablet by mouth 2 (Two) Times a Day.    Marizol Billy MD   KRILL OIL PO Take 2 tablets by mouth Daily.    Marizol Billy MD   Misc Natural Products (PROSTATE SUPPORT PO) Take 1 tablet by mouth 2 (Two) Times a Day. Super Beta Prostate    Marizol Billy MD   nitroglycerin (NITROSTAT) 0.4 MG SL tablet DISSOLVE ONE TABLET UNDER THE TONGUE EVERY 5 MINUTES AS NEEDED FOR CHEST PAIN.  DO NOT EXCEED A TOTAL OF 3 DOSES IN 15 MINUTES 3/15/24   Ren Sneed III, MD   Probiotic Product (Probiotic Daily) capsule Take 1 tablet by mouth Daily.    Marizol Billy MD   Saw Palmetto, Serenoa repens, (SAW PALMETTO PO) Take 1 tablet by mouth Daily.    Marizol Billy MD   tamsulosin (FLOMAX) 0.4 MG capsule 24 hr capsule Take 1 capsule by mouth Every Night.  Patient taking differently: Take 1 capsule by mouth Daily. 9/22/23   Luis Hughes MD        Allergies:  Allergies   Allergen Reactions    Lipitor [Atorvastatin] Provider Review Needed     Flu like symptoms     Oatmeal Other (See Comments)     Foggy feeling, swollen glands, myalgia       Social Hx:  Social History     Socioeconomic History    Marital status:    Tobacco Use    Smoking status: Former     Current packs/day: 0.00     Types: Cigarettes     Start date:      Quit date:      Years since quittin.6     Passive exposure: Past    Smokeless tobacco: Never   Vaping Use    Vaping status: Never Used   Substance and Sexual Activity    Alcohol use: Yes     Alcohol/week: 1.0 standard drink of alcohol     Types: 1 Glasses of wine per week     Comment: one drink per day    Drug use: Never    Sexual activity: Defer       Family Hx:  Family History   Problem Relation Age of Onset    Stroke Mother     Parkinsonism Father     Prostate cancer Brother     Stroke Other     Colon cancer Neg Hx        Review of Imaging (Interpretation of images not reports): The CT angiogram of the head and neck with contrast shows the following:    Head CT: The ventricles are enlarged indicating atrophy which is  prominent for age. There is no evidence of hemorrhage. No masses are  identified.  No extra-axial fluid is seen. The sinuses are unremarkable.     CTA:     Aortic arch:  Arch shows no significant narrowing. Great vessel origins  are widely patent.     Right carotid: Calcified and noncalcified plaque causes less than 50%  stenosis of the right internal carotid artery.     Left carotid: Mild calcified and noncalcified plaque causes less than  50% stenosis.     Vertebrals: The vertebrals are patent. No significant stenosis is  present. System is left vertebral dominant. Small plaque identified at  the origin of the left vertebral artery. Moderate calcification noted at  the origin of the right vertebral artery.     The cranial circulation demonstrates a very hypoplastic or diseased left  A1 segment. The left JAVIER fills from the anterior communicating artery..  There is no significant stenosis, aneurysm, or occlusion.      IMPRESSION:  Less than 50% stenosis bilateral internal carotid arteries.     Hypoplastic or very diseased left A1 segment.    CT Head Without Contrast    Result Date: 9/2/2024  PROCEDURE: CT HEAD WO CONTRAST-  HISTORY: History of brain bleed with headache, dizziness, eval intracranial hemorrhage  COMPARISON: None.  TECHNIQUE: Multiple axial CT images were performed from the foramen magnum to the vertex. Individualized dose reduction techniques using automated exposure control or adjustment of mA and/or kV according to the patient size were employed.  FINDINGS: There is moderate, age-appropriate generalized cerebral atrophy. The ventricles are enlarged. There is no evidence of edema or hemorrhage.  No masses are identified. No extra-axial fluid is seen. The paranasal sinuses are unremarkable.      Impression: Atrophy  without acute process.     CTDI: 29.81 mGy DLP:570.98 mGy.cm  This report was signed and finalized on 9/2/2024 11:59 AM by Ruth Velasquez MD.              Additional Tests Performed: The CT cerebral perfusion scan shows the following:    FINDINGS:     rCBF, 30% volume: 0 ml   Tmax > 6 sec :  0 ml     Mismatch volume: 0 ml.  Mismatch ratio: none     Comments:  There is no evidence or core infarct or ischemic penumbra.     IMPRESSION:  No evidence of core infarct or ischemic penumbra. No  evidence of large vessel occlusion.            Laboratory Results:   Lab Results   Component Value Date    GLUCOSE 98 09/02/2024    CALCIUM 9.1 09/02/2024     09/02/2024    K 4.3 09/02/2024    CO2 25.1 09/02/2024     09/02/2024    BUN 19 09/02/2024    CREATININE 0.90 09/02/2024    BCR 21.1 09/02/2024    ANIONGAP 8.9 09/02/2024     Lab Results   Component Value Date    WBC 4.86 09/02/2024    HGB 13.9 09/02/2024    HCT 41.6 09/02/2024    MCV 97.0 09/02/2024     09/02/2024     Lab Results   Component Value Date    CHOL 204 (H) 06/02/2023     Lab Results   Component Value Date    HDL 56 06/02/2023     Lab  "Results   Component Value Date     (H) 06/02/2023     Lab Results   Component Value Date    TRIG 49 06/02/2023     Lab Results   Component Value Date    HGBA1C 6.20 (H) 06/02/2023     Lab Results   Component Value Date    INR 0.97 09/02/2024    PROTIME 13.4 09/02/2024     No results found for: \"YWAMRHEE31\"  No results found for: \"FOLATE\"         Physical Examination:  /79   Pulse 60   Temp 97.8 °F (36.6 °C) (Oral)   Resp 18   Ht 180.3 cm (71\")   Wt 90.7 kg (200 lb)   SpO2 95%   BMI 27.89 kg/m²   General Appearance:   Well developed, well nourished, well groomed, alert, and cooperative.  HEENT: Normocephalic.    Neck and Spine: Normal range of motion.  Normal alignment. No mass or tenderness. No bruits.    Extremities:    No edema or deformities. Normal joint ROM.   Skin:    No rashes or birth marks.    Neurological examination:  Higher Integrative  Function: Oriented to time, place and person. Normal registration, recall, attention span and concentration. Normal language including comprehension, spontaneous speech, repetition, reading, writing, naming and vocabulary. No neglect with normal visual-spatial function and construction. Normal fund of knowledge and higher integrative function.  CN II:  Pupils are equal, round, and reactive to light. Normal visual acuity and visual fields.    CN III IV VI: Extraocular movements are full without nystagmus.   CN V:  Normal facial sensation and strength of muscles of mastication.  CN VII:  Facial movements are symmetric. No weakness.  CN VIII: Auditory acuity is normal.  CN IX & X: Symmetric palatal movement.  CN XI:  Sternocleidomastoid and trapezius are normal.  No weakness.  CN XII:  The tongue is midline.  No atrophy or fasciculations.  Motor:  Normal muscle strength, bulk and tone in upper and lower extremities.  No fasciculations, rigidity, spasticity, or abnormal movements.  Sensation: Normal to light touch, pinprick, vibration, temperature, and " proprioception in arms and legs. Normal graphesthesia and no extinction on DSS.  Station and Gait: Normal gait and station.  He is able to get up and ambulate independently and the Romberg is negative.  Coordination:  Finger to nose test shows no dysmetria.   Heel to shin normal.    NIHSS:    Baseline  0-->Alert: keenly responsive  0-->Answers both questions correctly  0-->Performs both tasks correctly  0=normal  0=No visual loss  0=Normal symmetric movement  0-->No drift: limb holds 90 (or 45) degrees for full 10 secs  0-->No drift: limb holds 90 (or 45) degrees for full 10 secs  0-->No drift: limb holds 90 (or 45) degrees for full 10 secs  0-->No drift: limb holds 90 (or 45) degrees for full 10 secs  0=Absent  0=Normal; no sensory loss  0=No aphasia, normal  0=Normal  0=No abnormality    Total score: 0    Diagnoses / Discussion:  79 y.o. who presents with Sx of lightheadedness and initial numbness which have all corrected and currently back to baseline.  The NIH stroke scale is 0 and so the patient is not considered a candidate for any acute thrombolytic therapy or any acute thrombectomy.    Plan:  Aspirin 81 mg  Brilinta 90 mg twice daily with food.  Patient unable to tolerate Plavix.  Hydrate  Neuro checks  Check lipid panel, Hgb A1C, TSH, sed rate, vitamin B12, folic acid levels  Unable to tolerate Lipitor or Crestor and so he will be a good candidate for Repatha or Leqvio which can be arranged as an outpatient if his LDL is elevated.  Non-pharmacological DVT prophylaxis  TTE with bubble study to look for PFO  As he passed the bedside swallow, he will be started on a cardiac diet.  Although his pacemaker is MRI compatible he cannot get it done in this institution because we do not have the capability.    Telemetry Unit admission/observation to look for cardiac arrhythmias  PT/OT/ST  Stroke Education  Smoking cessation protocol  Blood pressure control : Keep the systolic blood pressure between 110 and 120 and  the diastolic between 70-80 as he usually runs on the low side as per his wife.  Goal LDL <70-recommend high dose statins-   Serum glucose < 140  Body mass index: 28 kg/m² which is within normal limits.  Obstructive sleep apnea screening with a STOP-BANG questionnaire.     Discussed signs and symptoms of stroke and when to call 911. Instructed to follow a low fat diet including the Mediterranean diet. Instructed to take all medications daily as prescribed. Encouraged 30 minutes of exercise 3-4 times a week as tolerated. Stay well hydrated. Discussed potential side effects of new medications and signs and symptoms to report.  Reviewed stroke risk factors and stroke prevention plan. Patient and family verbalizes understanding and agrees with plan.     I have discussed the above with the patient, Dr. Jose M Begum the ER physician and family.  He will be followed up by our inpatient teleneurology service with me tomorrow.  Time spent with patient: 70 minutes in face-to-face evaluation and management of the patient using the dedicated telemedicine device without any interruption with the help of the emergency room nurse with the patient located at the [facility] and myself at a remote location.    Electronically signed by Feroz Zelaya MD, 09/02/24, 1:10 PM EDT.    Dictated using Dragon dictation.

## 2024-09-02 NOTE — H&P
[facility]      Name:  Lizandro Culp   Age:  79 y.o.  Sex:  male  :  1945  MRN:  [id number]   Visit Number:  [id number]  Admission Date:  2024  Date Of Service:  24  Primary Care Physician:  Dorcas Figueroa MD    Chief Complaint:     Dizziness    History Of Presenting Illness:      Mr. Mccracken is a 79-year-old male with pertinent past medical history of sick sinus syndrome status post pacemaker placement, coronary artery disease status post PCI x 7, and prostate cancer who presented to emergency department due to dizziness while walking around at [employer] around 1030 this morning.  Patient denied associated nausea, vomiting, chest pain, or shortness of air.  He did endorse left facial and upper extremity numbness as well.  Denied unilateral weakness.  He does follow with Dr. Sneed cardiology.    Upon ED presentation patient hemodynamically stable on room air.  Labs largely unremarkable.  CT of head/CTA of head and neck with less than 50% stenosis bilateral internal carotid arteries, perfusion scan with no evidence of core infarct.  Neurology consulted.  Hospitalist consulted for further medical management.  Unable to perform MRI at current facility due to pacemaker.  Patient intolerant to statins in the past.  Neurology suggested Brilinta/aspirin/echo.  Of note, during exam patient heart rate noted to drop to 32, patient asymptomatic.  Device interrogation ordered.      Review Of Systems:    All systems were reviewed and negative except as mentioned in history of presenting illness, assessment and plan.    Past Medical History: Patient  has a past medical history of Bradycardia, CAD (coronary artery disease), Hearing difficulty, Heart attack (2017), Hiatal hernia, Hyperthyroidism, Orthostatic hypotension, Prostate cancer, Refusal of statin medication by patient, Refused pneumococcal vaccination, Sick sinus syndrome, Skin cancer, Snores, and  Trigger finger.    Past Surgical History: Patient  has a past surgical history that includes Inguinal hernia repair (Bilateral); Coronary angioplasty (2003); Insert / replace / remove pacemaker (2020); Coronary angioplasty with stent (2017); Back surgery; Colonoscopy; Cardiac catheterization; Cardiac catheterization (N/A, 06/02/2023); Prostate Fiducial Marker Placement (09/12/2023); and Cyberknife (10/20/2023).    Social History: Patient  reports that he quit smoking about 41 years ago. His smoking use included cigarettes. He started smoking about 66 years ago. He has been exposed to tobacco smoke. He has never used smokeless tobacco. He reports current alcohol use of about 1.0 standard drink of alcohol per week. He reports that he does not use drugs.    Family History:  Patient's family history has been reviewed and found to be noncontributory.     Allergies:      Lipitor [atorvastatin] and Oatmeal    Home Medications:    Prior to Admission Medications       Prescriptions Last Dose Informant Patient Reported? Taking?    Ascorbic Acid (VITAMIN C PO)   Yes No    Take 1,000 mg by mouth Daily.    Aspirin 81 MG capsule   Yes No    Take 1 capsule every day by oral route.    chlorhexidine (PERIDEX) 0.12 % solution   Yes No    SWISH AND SPIT IN MOUTH AT NIGHT AS DIRECTED    Cholecalciferol (VITAMIN D3 PO)   Yes No    Take 2,000 mg by mouth Daily.    DIGESTIVE ENZYMES PO   Yes No    Take 1 tablet by mouth 2 (Two) Times a Day.    KRILL OIL PO   Yes No    Take 2 tablets by mouth Daily.    Misc Natural Products (PROSTATE SUPPORT PO)   Yes No    Take 1 tablet by mouth 2 (Two) Times a Day. Super Beta Prostate    nitroglycerin (NITROSTAT) 0.4 MG SL tablet   No No    DISSOLVE ONE TABLET UNDER THE TONGUE EVERY 5 MINUTES AS NEEDED FOR CHEST PAIN.  DO NOT EXCEED A TOTAL OF 3 DOSES IN 15 MINUTES    Probiotic Product (Probiotic Daily) capsule   Yes No    Take 1 tablet by mouth Daily.    Saw Palmetto, Serenoa repens, (SAW PALMETTO PO)    "Yes No    Take 1 tablet by mouth Daily.    tamsulosin (FLOMAX) 0.4 MG capsule 24 hr capsule  Self No No    Take 1 capsule by mouth Every Night.    Patient taking differently:  Take 1 capsule by mouth Daily.          ED Medications:    Medications   sodium chloride 0.9 % flush 10 mL (has no administration in time range)   sodium chloride 0.9 % flush 10 mL (has no administration in time range)   sodium chloride 0.9 % flush 10 mL (has no administration in time range)   sodium chloride 0.9 % infusion 40 mL (has no administration in time range)   acetaminophen (TYLENOL) tablet 650 mg (has no administration in time range)     Or   acetaminophen (TYLENOL) suppository 650 mg (has no administration in time range)   ondansetron (ZOFRAN) injection 4 mg (has no administration in time range)   bisacodyl (DULCOLAX) suppository 10 mg (has no administration in time range)   aluminum-magnesium hydroxide-simethicone (MAALOX MAX) 400-400-40 MG/5ML suspension 7.5 mL (has no administration in time range)   aspirin chewable tablet 81 mg (81 mg Oral Not Given 9/2/24 1347)     Or   aspirin suppository 300 mg ( Rectal Not Given:  See Alt 9/2/24 1347)   ticagrelor (BRILINTA) tablet 90 mg (has no administration in time range)   lactated ringers bolus 1,000 mL (1,000 mL Intravenous New Bag 9/2/24 1137)   iopamidol (ISOVUE-300) 61 % injection 50 mL (40 mL Intravenous Given 9/2/24 1244)   iopamidol (ISOVUE-300) 61 % injection 100 mL (100 mL Intravenous Given 9/2/24 1244)     Vital Signs:  Temp:  [97.8 °F (36.6 °C)] 97.8 °F (36.6 °C)  Heart Rate:  [60-66] 60  Resp:  [18] 18  BP: (100-157)/() 157/84        09/02/24  1036   Weight: 90.7 kg (200 lb)     Body mass index is 27.89 kg/m².    Physical Exam:     Most recent vital Signs: /84   Pulse 60   Temp 97.8 °F (36.6 °C) (Oral)   Resp 18   Ht 180.3 cm (71\")   Wt 90.7 kg (200 lb)   SpO2 97%   BMI 27.89 kg/m²     Physical Exam  Vitals and nursing note reviewed.   HENT:      Head: " Normocephalic.      Mouth/Throat:      Mouth: Mucous membranes are moist.   Eyes:      Pupils: Pupils are equal, round, and reactive to light.   Cardiovascular:      Rate and Rhythm: Normal rate and regular rhythm.      Pulses: Normal pulses.      Heart sounds: Normal heart sounds.   Pulmonary:      Effort: Pulmonary effort is normal.      Breath sounds: Normal breath sounds.   Abdominal:      General: Bowel sounds are normal.      Palpations: Abdomen is soft.   Musculoskeletal:         General: Normal range of motion.      Cervical back: Normal range of motion.   Skin:     General: Skin is warm.      Capillary Refill: Capillary refill takes less than 2 seconds.   Neurological:      General: No focal deficit present.      Mental Status: He is alert and oriented to person, place, and time.   Psychiatric:         Mood and Affect: Mood normal.         Laboratory data:    I have reviewed the labs done in the emergency room.    Results from last 7 days   Lab Units 09/02/24  1050   SODIUM mmol/L 139   POTASSIUM mmol/L 4.3   CHLORIDE mmol/L 105   CO2 mmol/L 25.1   BUN mg/dL 19   CREATININE mg/dL 0.90   CALCIUM mg/dL 9.1   BILIRUBIN mg/dL 0.5   ALK PHOS U/L 86   ALT (SGPT) U/L 15   AST (SGOT) U/L 16   GLUCOSE mg/dL 98     Results from last 7 days   Lab Units 09/02/24  1050   WBC 10*3/mm3 4.86   HEMOGLOBIN g/dL 13.9   HEMATOCRIT % 41.6   PLATELETS 10*3/mm3 165     Results from last 7 days   Lab Units 09/02/24  1235   INR  0.97     Results from last 7 days   Lab Units 09/02/24  1050   HSTROP T ng/L 8     Results from last 7 days   Lab Units 09/02/24  1050   PROBNP pg/mL 46.3         Results from last 7 days   Lab Units 09/02/24  1050   LIPASE U/L 16         Results from last 7 days   Lab Units 09/02/24  1248   COLOR UA  Yellow   GLUCOSE UA  Negative   KETONES UA  Negative   BLOOD UA  Negative   LEUKOCYTES UA  Negative   PH, URINE  7.0   BILIRUBIN UA  Negative   UROBILINOGEN UA  1.0 E.U./dL         EKG:      Bpm 60 sinus  rhythm    Radiology:    CT Angiogram Head    Result Date: 9/2/2024  PROCEDURE: CT ANGIOGRAM HEAD-, CT ANGIOGRAM NECK-  HISTORY: Neuro deficit, acute, stroke suspected  TECHNIQUE: Thin section axial CT with IV contrast supplemented with multiplanar 3 D reconstructions of the head and neck. This study was performed with techniques to keep radiation doses as low as reasonably achievable, (ALARA). Individualized dose reduction techniques using automated exposure control or adjustment of mA and/or kV according to the patient size were employed.  FINDINGS:  Head CT: The ventricles are enlarged indicating atrophy which is prominent for age. There is no evidence of hemorrhage. No masses are identified.  No extra-axial fluid is seen. The sinuses are unremarkable.  CTA:  Aortic arch:  Arch shows no significant narrowing. Great vessel origins are widely patent.  Right carotid: Calcified and noncalcified plaque causes less than 50% stenosis of the right internal carotid artery.  Left carotid: Mild calcified and noncalcified plaque causes less than 50% stenosis.  Vertebrals: The vertebrals are patent. No significant stenosis is present. System is left vertebral dominant. Small plaque identified at the origin of the left vertebral artery. Moderate calcification noted at the origin of the right vertebral artery.  The cranial circulation demonstrates a very hypoplastic or diseased left A1 segment. The left JAVIER fills from the anterior communicating artery.. There is no significant stenosis, aneurysm, or occlusion.      Less than 50% stenosis bilateral internal carotid arteries.  Hypoplastic or very diseased left A1 segment.    CTDI: DLP:3623.85 mGy.cm   This report was signed and finalized on 9/2/2024 1:13 PM by Ruth Velasquez MD.      CT Angiogram Neck    Result Date: 9/2/2024  PROCEDURE: CT ANGIOGRAM HEAD-, CT ANGIOGRAM NECK-  HISTORY: Neuro deficit, acute, stroke suspected  TECHNIQUE: Thin section axial CT with IV contrast  supplemented with multiplanar 3 D reconstructions of the head and neck. This study was performed with techniques to keep radiation doses as low as reasonably achievable, (ALARA). Individualized dose reduction techniques using automated exposure control or adjustment of mA and/or kV according to the patient size were employed.  FINDINGS:  Head CT: The ventricles are enlarged indicating atrophy which is prominent for age. There is no evidence of hemorrhage. No masses are identified.  No extra-axial fluid is seen. The sinuses are unremarkable.  CTA:  Aortic arch:  Arch shows no significant narrowing. Great vessel origins are widely patent.  Right carotid: Calcified and noncalcified plaque causes less than 50% stenosis of the right internal carotid artery.  Left carotid: Mild calcified and noncalcified plaque causes less than 50% stenosis.  Vertebrals: The vertebrals are patent. No significant stenosis is present. System is left vertebral dominant. Small plaque identified at the origin of the left vertebral artery. Moderate calcification noted at the origin of the right vertebral artery.  The cranial circulation demonstrates a very hypoplastic or diseased left A1 segment. The left JAVIER fills from the anterior communicating artery.. There is no significant stenosis, aneurysm, or occlusion.      Less than 50% stenosis bilateral internal carotid arteries.  Hypoplastic or very diseased left A1 segment.    CTDI: DLP:3623.85 mGy.cm   This report was signed and finalized on 9/2/2024 1:13 PM by Ruth Velasquez MD.      CT CEREBRAL PERFUSION WITH & WITHOUT CONTRAST    Result Date: 9/2/2024  HEAD CT PERFUSION WITH CONTRAST    9/2/2024 12:29 PM  HISTORY: Neuro deficit, acute, stroke suspected.  COMPARISON: None.  TECHNIQUE: Multiple axial CT images were performed from the foramen magnum to the vertex. Limited dynamic postcontrast images were obtained. Calculations of cerebral blood flow, mean transit time, cerebral blood volume were  performed and evaluated. This study was performed with techniques to keep radiation doses as low as reasonably achievable, (ALARA). Individualized dose reduction techniques using automated exposure control or adjustment of mA and/or kV according to the patient size were employed.  FINDINGS:  rCBF, 30% volume: 0 ml Tmax > 6 sec :  0 ml  Mismatch volume: 0 ml. Mismatch ratio: none  Comments:  There is no evidence or core infarct or ischemic penumbra.      No evidence of core infarct or ischemic penumbra. No evidence of large vessel occlusion.      CTDI: DLP:3623.85 mGy.cm     This study was performed with techniques to keep radiation doses as low as reasonably achievable (ALARA). Individualized dose reduction techniques using automated exposure control or adjustment of mA and/or kV according to the patient size were employed.   This report was signed and finalized on 9/2/2024 1:00 PM by Ruth Velasquez MD.      CT Head Without Contrast    Result Date: 9/2/2024  PROCEDURE: CT HEAD WO CONTRAST-  HISTORY: History of brain bleed with headache, dizziness, eval intracranial hemorrhage  COMPARISON: None.  TECHNIQUE: Multiple axial CT images were performed from the foramen magnum to the vertex. Individualized dose reduction techniques using automated exposure control or adjustment of mA and/or kV according to the patient size were employed.  FINDINGS: There is moderate, age-appropriate generalized cerebral atrophy. The ventricles are enlarged. There is no evidence of edema or hemorrhage.  No masses are identified. No extra-axial fluid is seen. The paranasal sinuses are unremarkable.      Atrophy  without acute process.     CTDI: 29.81 mGy DLP:570.98 mGy.cm  This report was signed and finalized on 9/2/2024 11:59 AM by Ruth Velasquez MD.      XR Chest 1 View    Result Date: 9/2/2024  PROCEDURE: XR CHEST 1 VW-  HISTORY: Dizziness, eval pneumonia  COMPARISON: December 15, 2023..  FINDINGS: The heart is normal in size. The lungs are  clear. The mediastinum is unremarkable. There is no pneumothorax.  There are no acute osseous abnormalities. Decreased inspiratory effort noted. 2-lead pacemaker again noted. Aortic mural calcifications identified.      No acute cardiopulmonary process.      This report was signed and finalized on 9/2/2024 11:40 AM by Ruth Velasquez MD.       Assessment:    Dizziness, POA  History of sick sinus syndrome status post pacemaker placement  Coronary artery disease status post PCI x 7  History of prostate cancer    Plan:    -Continue workup with echo.  -Neurochecks.  -A.m. lipid/A1c.  -Patient refusing Brilinta and further aspirin today.  States Plavix caused him to pass out in the past and does not want to take any further blood thinners besides aspirin.  -Statin intolerance.  -Pacemaker interrogation.  -Continue Flomax.  -Further orders pending clinical course.    Risk Assessment: High  DVT Prophylaxis: SCDs  Code Status: Full code  Diet: Cardiac    Advance Care Planning   ACP discussion was declined by the patient. Patient does not have an advance directive, declines further assistance.           Dorcas Mac, ROMAIN  09/02/24  14:20 EDT    Dictated utilizing Dragon dictation.

## 2024-09-03 ENCOUNTER — APPOINTMENT (OUTPATIENT)
Dept: CT IMAGING | Facility: HOSPITAL | Age: 79
End: 2024-09-03
Payer: MEDICARE

## 2024-09-03 ENCOUNTER — APPOINTMENT (OUTPATIENT)
Dept: CARDIOLOGY | Facility: HOSPITAL | Age: 79
End: 2024-09-03
Payer: MEDICARE

## 2024-09-03 VITALS
HEART RATE: 60 BPM | TEMPERATURE: 97.7 F | BODY MASS INDEX: 27.16 KG/M2 | RESPIRATION RATE: 16 BRPM | OXYGEN SATURATION: 96 % | DIASTOLIC BLOOD PRESSURE: 75 MMHG | SYSTOLIC BLOOD PRESSURE: 119 MMHG | HEIGHT: 71 IN | WEIGHT: 194 LBS

## 2024-09-03 LAB
ANION GAP SERPL CALCULATED.3IONS-SCNC: 11.6 MMOL/L (ref 5–15)
BASOPHILS # BLD AUTO: 0.03 10*3/MM3 (ref 0–0.2)
BASOPHILS NFR BLD AUTO: 0.6 % (ref 0–1.5)
BH CV ECHO MEAS - AO MAX PG: 12.6 MMHG
BH CV ECHO MEAS - AO MEAN PG: 6.5 MMHG
BH CV ECHO MEAS - AO ROOT DIAM: 3 CM
BH CV ECHO MEAS - AO V2 MAX: 176.5 CM/SEC
BH CV ECHO MEAS - AO V2 VTI: 38.6 CM
BH CV ECHO MEAS - AVA(I,D): 1.72 CM2
BH CV ECHO MEAS - EDV(CUBED): 98 ML
BH CV ECHO MEAS - EDV(MOD-SP2): 69.5 ML
BH CV ECHO MEAS - EDV(MOD-SP4): 107 ML
BH CV ECHO MEAS - EF(MOD-BP): 59.3 %
BH CV ECHO MEAS - EF(MOD-SP2): 54.4 %
BH CV ECHO MEAS - EF(MOD-SP4): 60.8 %
BH CV ECHO MEAS - EF_3D-VOL: 52 %
BH CV ECHO MEAS - ESV(CUBED): 41.4 ML
BH CV ECHO MEAS - ESV(MOD-SP2): 31.7 ML
BH CV ECHO MEAS - ESV(MOD-SP4): 41.9 ML
BH CV ECHO MEAS - FS: 24.9 %
BH CV ECHO MEAS - IVS/LVPW: 1.2 CM
BH CV ECHO MEAS - IVSD: 1.22 CM
BH CV ECHO MEAS - LA DIMENSION: 4.5 CM
BH CV ECHO MEAS - LAT PEAK E' VEL: 10.2 CM/SEC
BH CV ECHO MEAS - LV DIASTOLIC VOL/BSA (35-75): 51.9 CM2
BH CV ECHO MEAS - LV MASS(C)D: 186.5 GRAMS
BH CV ECHO MEAS - LV MAX PG: 3.7 MMHG
BH CV ECHO MEAS - LV MEAN PG: 2 MMHG
BH CV ECHO MEAS - LV SYSTOLIC VOL/BSA (12-30): 20.3 CM2
BH CV ECHO MEAS - LV V1 MAX: 95.6 CM/SEC
BH CV ECHO MEAS - LV V1 VTI: 22.2 CM
BH CV ECHO MEAS - LVIDD: 4.6 CM
BH CV ECHO MEAS - LVIDS: 3.5 CM
BH CV ECHO MEAS - LVOT AREA: 3 CM2
BH CV ECHO MEAS - LVOT DIAM: 1.95 CM
BH CV ECHO MEAS - LVPWD: 1.02 CM
BH CV ECHO MEAS - MED PEAK E' VEL: 8.6 CM/SEC
BH CV ECHO MEAS - MV A MAX VEL: 105 CM/SEC
BH CV ECHO MEAS - MV DEC SLOPE: 258 CM/SEC2
BH CV ECHO MEAS - MV DEC TIME: 0.35 SEC
BH CV ECHO MEAS - MV E MAX VEL: 89.7 CM/SEC
BH CV ECHO MEAS - MV E/A: 0.85
BH CV ECHO MEAS - MV MAX PG: 7.3 MMHG
BH CV ECHO MEAS - MV MEAN PG: 3 MMHG
BH CV ECHO MEAS - MV V2 VTI: 40.8 CM
BH CV ECHO MEAS - MVA(VTI): 1.62 CM2
BH CV ECHO MEAS - PULM A REVS DUR: 0.15 SEC
BH CV ECHO MEAS - PULM A REVS VEL: 26.8 CM/SEC
BH CV ECHO MEAS - PULM DIAS VEL: 24.6 CM/SEC
BH CV ECHO MEAS - PULM S/D: 1.81
BH CV ECHO MEAS - PULM SYS VEL: 44.5 CM/SEC
BH CV ECHO MEAS - RAP SYSTOLE: 3 MMHG
BH CV ECHO MEAS - SV(LVOT): 66.3 ML
BH CV ECHO MEAS - SV(MOD-SP2): 37.8 ML
BH CV ECHO MEAS - SV(MOD-SP4): 65.1 ML
BH CV ECHO MEAS - SVI(LVOT): 32.2 ML/M2
BH CV ECHO MEAS - SVI(MOD-SP2): 18.3 ML/M2
BH CV ECHO MEAS - SVI(MOD-SP4): 31.6 ML/M2
BH CV ECHO MEAS - TAPSE (>1.6): 2.8 CM
BH CV ECHO MEASUREMENTS AVERAGE E/E' RATIO: 9.54
BH CV ECHO SHUNT ASSESSMENT PERFORMED (HIDDEN SCRIPTING): 1
BH CV XLRA - TDI S': 16 CM/SEC
BUN SERPL-MCNC: 13 MG/DL (ref 8–23)
BUN/CREAT SERPL: 15.9 (ref 7–25)
CALCIUM SPEC-SCNC: 8.7 MG/DL (ref 8.6–10.5)
CHLORIDE SERPL-SCNC: 107 MMOL/L (ref 98–107)
CHOLEST SERPL-MCNC: 202 MG/DL (ref 0–200)
CO2 SERPL-SCNC: 21.4 MMOL/L (ref 22–29)
CREAT SERPL-MCNC: 0.82 MG/DL (ref 0.76–1.27)
DEPRECATED RDW RBC AUTO: 47.8 FL (ref 37–54)
EGFRCR SERPLBLD CKD-EPI 2021: 89.9 ML/MIN/1.73
EOSINOPHIL # BLD AUTO: 0.15 10*3/MM3 (ref 0–0.4)
EOSINOPHIL NFR BLD AUTO: 3.1 % (ref 0.3–6.2)
ERYTHROCYTE [DISTWIDTH] IN BLOOD BY AUTOMATED COUNT: 13.3 % (ref 12.3–15.4)
ERYTHROCYTE [SEDIMENTATION RATE] IN BLOOD: 18 MM/HR (ref 0–20)
FOLATE SERPL-MCNC: 5.49 NG/ML (ref 4.78–24.2)
GLUCOSE BLDC GLUCOMTR-MCNC: 100 MG/DL (ref 70–130)
GLUCOSE SERPL-MCNC: 103 MG/DL (ref 65–99)
HBA1C MFR BLD: 5.6 % (ref 4.8–5.6)
HCT VFR BLD AUTO: 41.8 % (ref 37.5–51)
HDLC SERPL-MCNC: 55 MG/DL (ref 40–60)
HGB BLD-MCNC: 14.2 G/DL (ref 13–17.7)
IMM GRANULOCYTES # BLD AUTO: 0.02 10*3/MM3 (ref 0–0.05)
IMM GRANULOCYTES NFR BLD AUTO: 0.4 % (ref 0–0.5)
LDLC SERPL CALC-MCNC: 135 MG/DL (ref 0–100)
LDLC/HDLC SERPL: 2.43 {RATIO}
LEFT ATRIUM VOLUME INDEX: 24.1 ML/M2
LYMPHOCYTES # BLD AUTO: 1.05 10*3/MM3 (ref 0.7–3.1)
LYMPHOCYTES NFR BLD AUTO: 22 % (ref 19.6–45.3)
MCH RBC QN AUTO: 32.7 PG (ref 26.6–33)
MCHC RBC AUTO-ENTMCNC: 34 G/DL (ref 31.5–35.7)
MCV RBC AUTO: 96.3 FL (ref 79–97)
MONOCYTES # BLD AUTO: 0.35 10*3/MM3 (ref 0.1–0.9)
MONOCYTES NFR BLD AUTO: 7.3 % (ref 5–12)
NEUTROPHILS NFR BLD AUTO: 3.17 10*3/MM3 (ref 1.7–7)
NEUTROPHILS NFR BLD AUTO: 66.6 % (ref 42.7–76)
NRBC BLD AUTO-RTO: 0 /100 WBC (ref 0–0.2)
PLATELET # BLD AUTO: 162 10*3/MM3 (ref 140–450)
PMV BLD AUTO: 10.9 FL (ref 6–12)
POTASSIUM SERPL-SCNC: 4.1 MMOL/L (ref 3.5–5.2)
RBC # BLD AUTO: 4.34 10*6/MM3 (ref 4.14–5.8)
SODIUM SERPL-SCNC: 140 MMOL/L (ref 136–145)
TRIGL SERPL-MCNC: 67 MG/DL (ref 0–150)
TSH SERPL DL<=0.05 MIU/L-ACNC: 2.98 UIU/ML (ref 0.27–4.2)
VIT B12 BLD-MCNC: 476 PG/ML (ref 211–946)
VLDLC SERPL-MCNC: 12 MG/DL (ref 5–40)
WBC NRBC COR # BLD AUTO: 4.77 10*3/MM3 (ref 3.4–10.8)

## 2024-09-03 PROCEDURE — 82746 ASSAY OF FOLIC ACID SERUM: CPT | Performed by: PSYCHIATRY & NEUROLOGY

## 2024-09-03 PROCEDURE — 97165 OT EVAL LOW COMPLEX 30 MIN: CPT

## 2024-09-03 PROCEDURE — G0378 HOSPITAL OBSERVATION PER HR: HCPCS

## 2024-09-03 PROCEDURE — 99239 HOSP IP/OBS DSCHRG MGMT >30: CPT | Performed by: NURSE PRACTITIONER

## 2024-09-03 PROCEDURE — 97161 PT EVAL LOW COMPLEX 20 MIN: CPT

## 2024-09-03 PROCEDURE — 85652 RBC SED RATE AUTOMATED: CPT | Performed by: PSYCHIATRY & NEUROLOGY

## 2024-09-03 PROCEDURE — 82948 REAGENT STRIP/BLOOD GLUCOSE: CPT | Performed by: PSYCHIATRY & NEUROLOGY

## 2024-09-03 PROCEDURE — 80061 LIPID PANEL: CPT | Performed by: PSYCHIATRY & NEUROLOGY

## 2024-09-03 PROCEDURE — 93306 TTE W/DOPPLER COMPLETE: CPT | Performed by: INTERNAL MEDICINE

## 2024-09-03 PROCEDURE — 93306 TTE W/DOPPLER COMPLETE: CPT

## 2024-09-03 PROCEDURE — 80048 BASIC METABOLIC PNL TOTAL CA: CPT | Performed by: NURSE PRACTITIONER

## 2024-09-03 PROCEDURE — 99214 OFFICE O/P EST MOD 30 MIN: CPT | Performed by: STUDENT IN AN ORGANIZED HEALTH CARE EDUCATION/TRAINING PROGRAM

## 2024-09-03 PROCEDURE — 82607 VITAMIN B-12: CPT | Performed by: PSYCHIATRY & NEUROLOGY

## 2024-09-03 PROCEDURE — 70450 CT HEAD/BRAIN W/O DYE: CPT

## 2024-09-03 PROCEDURE — 84443 ASSAY THYROID STIM HORMONE: CPT | Performed by: PSYCHIATRY & NEUROLOGY

## 2024-09-03 PROCEDURE — 85025 COMPLETE CBC W/AUTO DIFF WBC: CPT | Performed by: NURSE PRACTITIONER

## 2024-09-03 PROCEDURE — 83036 HEMOGLOBIN GLYCOSYLATED A1C: CPT | Performed by: PSYCHIATRY & NEUROLOGY

## 2024-09-03 RX ORDER — ASPIRIN 325 MG
325 TABLET, DELAYED RELEASE (ENTERIC COATED) ORAL DAILY
Start: 2024-09-03

## 2024-09-03 RX ADMIN — Medication 10 ML: at 09:01

## 2024-09-03 NOTE — PROGRESS NOTES
Stroke Progress Note       Chief Complaint:      Subjective    Subjective     Subjective:  Pateint feels back to normalize  Expect numbness in the left cheek     Objective      Temp:  [97.4 °F (36.3 °C)-98.4 °F (36.9 °C)] 98.4 °F (36.9 °C)  Heart Rate:  [60-62] 60  Resp:  [16-18] 18  BP: (100-157)/() 120/75          NEURO( Exam is performed with help of hospital staff at bed side and observed by me via audio-video interface)    MENTAL STATUS: AAOx3, memory intact, fund of knowledge appropriate    LANG/SPEECH: Naming and repetition intact, fluent, follows 3-step commands    CRANIAL NERVES:    Pupils equal and reactive, EOMI intact, no gaze deviation, no nystagmus  No facial droop, cough and gag intact, shoulder shrug intact, tongue midline     MOTOR:  Moves all extremities equally    SENSORY: Normal to light touch all throughout     COORDINATION: Normal finger to nose and heel to shin, no tremor, no dysmetria    STATION: Not assessed due to patient condition    GAIT: Not assessed due to patient condition     Results Review:    I reviewed the patient's new clinical results.    Lab Results (last 24 hours)       Procedure Component Value Units Date/Time    TSH [[id number]]  (Normal) Collected: 09/03/24 0716    Specimen: Blood Updated: 09/03/24 0830     TSH 2.980 uIU/mL     Vitamin B12 [[id number]] Collected: 09/03/24 0716    Specimen: Blood Updated: 09/03/24 0829    Folate [[id number]] Collected: 09/03/24 0716    Specimen: Blood Updated: 09/03/24 0829    Lipid Panel [[id number]]  (Abnormal) Collected: 09/03/24 0716    Specimen: Blood Updated: 09/03/24 0827     Total Cholesterol 202 mg/dL      Triglycerides 67 mg/dL      HDL Cholesterol 55 mg/dL      LDL Cholesterol  135 mg/dL      VLDL Cholesterol 12 mg/dL      LDL/HDL Ratio 2.43    Narrative:      Cholesterol Reference Ranges  (U.S. Department of Health and Human Services ATP III Classifications)    Desirable          <200 mg/dL  Borderline High    200-239  mg/dL  High Risk          >240 mg/dL      Triglyceride Reference Ranges  (U.S. Department of Health and Human Services ATP III Classifications)    Normal           <150 mg/dL  Borderline High  150-199 mg/dL  High             200-499 mg/dL  Very High        >500 mg/dL    HDL Reference Ranges  (U.S. Department of Health and Human Services ATP III Classifications)    Low     <40 mg/dl (major risk factor for CHD)  High    >60 mg/dl ('negative' risk factor for CHD)        LDL Reference Ranges  (U.S. Department of Health and Human Services ATP III Classifications)    Optimal          <100 mg/dL  Near Optimal     100-129 mg/dL  Borderline High  130-159 mg/dL  High             160-189 mg/dL  Very High        >189 mg/dL    Basic Metabolic Panel [[id number]]  (Abnormal) Collected: 09/03/24 0716    Specimen: Blood Updated: 09/03/24 0827     Glucose 103 mg/dL      BUN 13 mg/dL      Creatinine 0.82 mg/dL      Sodium 140 mmol/L      Potassium 4.1 mmol/L      Chloride 107 mmol/L      CO2 21.4 mmol/L      Calcium 8.7 mg/dL      BUN/Creatinine Ratio 15.9     Anion Gap 11.6 mmol/L      eGFR 89.9 mL/min/1.73     Narrative:      GFR Normal >60  Chronic Kidney Disease <60  Kidney Failure <15    The GFR formula is only valid for adults with stable renal function between ages 18 and 70.    Hemoglobin A1c [[id number]]  (Normal) Collected: 09/03/24 0716    Specimen: Blood Updated: 09/03/24 0815     Hemoglobin A1C 5.60 %     Narrative:      Hemoglobin A1C Ranges:    Increased Risk for Diabetes  5.7% to 6.4%  Diabetes                     >= 6.5%  Diabetic Goal                < 7.0%    CBC Auto Differential [[id number]]  (Normal) Collected: 09/03/24 0716    Specimen: Blood Updated: 09/03/24 0805     WBC 4.77 10*3/mm3      RBC 4.34 10*6/mm3      Hemoglobin 14.2 g/dL      Hematocrit 41.8 %      MCV 96.3 fL      MCH 32.7 pg      MCHC 34.0 g/dL      RDW 13.3 %      RDW-SD 47.8 fl      MPV 10.9 fL      Platelets 162 10*3/mm3      Neutrophil % 66.6 %       Lymphocyte % 22.0 %      Monocyte % 7.3 %      Eosinophil % 3.1 %      Basophil % 0.6 %      Immature Grans % 0.4 %      Neutrophils, Absolute 3.17 10*3/mm3      Lymphocytes, Absolute 1.05 10*3/mm3      Monocytes, Absolute 0.35 10*3/mm3      Eosinophils, Absolute 0.15 10*3/mm3      Basophils, Absolute 0.03 10*3/mm3      Immature Grans, Absolute 0.02 10*3/mm3      nRBC 0.0 /100 WBC     Sedimentation Rate [[id number]]  (Normal) Collected: 09/03/24 0716    Specimen: Blood Updated: 09/03/24 0804     Sed Rate 18 mm/hr     POC Glucose Q6H [[id number]]  (Normal) Collected: 09/03/24 0001    Specimen: Blood Updated: 09/03/24 0004     Glucose 100 mg/dL      Comment: Serial Number: [id number]:  [id number]       POC Glucose Once [[id number]]  (Normal) Collected: 09/02/24 1630    Specimen: Blood Updated: 09/02/24 1633     Glucose 95 mg/dL      Comment: Serial Number: [id number]:  [id number]       Urinalysis With Culture If Indicated - Urine, Clean Catch [[id number]]  (Normal) Collected: 09/02/24 1248    Specimen: Urine, Clean Catch Updated: 09/02/24 1302     Color, UA Yellow     Appearance, UA Clear     pH, UA 7.0     Specific Gravity, UA 1.023     Glucose, UA Negative     Ketones, UA Negative     Bilirubin, UA Negative     Blood, UA Negative     Protein, UA Negative     Leuk Esterase, UA Negative     Nitrite, UA Negative     Urobilinogen, UA 1.0 E.U./dL    Narrative:      In absence of clinical symptoms, the presence of pyuria, bacteria, and/or nitrites on the urinalysis result does not correlate with infection.  Urine microscopic not indicated.    Protime-INR [[id number]]  (Normal) Collected: 09/02/24 1235    Specimen: Blood Updated: 09/02/24 1243     Protime 13.4 Seconds      INR 0.97    Narrative:      Suggested INR therapeutic range for stable oral anticoagulant therapy:    Low Intensity therapy:   1.5-2.0  Moderate Intensity therapy:   2.0-3.0  High Intensity therapy:   2.5-4.0    C-reactive Protein [[id number]]   "(Normal) Collected: 09/02/24 1050    Specimen: Blood Updated: 09/02/24 1150     C-Reactive Protein <0.30 mg/dL     Procalcitonin [[id number]]  (Normal) Collected: 09/02/24 1050    Specimen: Blood Updated: 09/02/24 1128     Procalcitonin 0.03 ng/mL     Narrative:      As a Marker for Sepsis (Non-Neonates):    1. <0.5 ng/mL represents a low risk of severe sepsis and/or septic shock.  2. >2 ng/mL represents a high risk of severe sepsis and/or septic shock.    As a Marker for Lower Respiratory Tract Infections that require antibiotic therapy:    PCT on Admission    Antibiotic Therapy       6-12 Hrs later    >0.5                Strongly Recommended  >0.25 - <0.5        Recommended   0.1 - 0.25          Discouraged              Remeasure/reassess PCT  <0.1                Strongly Discouraged     Remeasure/reassess PCT    As 28 day mortality risk marker: \"Change in Procalcitonin Result\" (>80% or <=80%) if Day 0 (or Day 1) and Day 4 values are available. Refer to [email or web address]://[email or web address]-pct-[email or web address]    Change in PCT <=80%  A decrease of PCT levels below or equal to 80% defines a positive change in PCT test result representing a higher risk for 28-day all-cause mortality of patients diagnosed with severe sepsis for septic shock.    Change in PCT >80%  A decrease of PCT levels of more than 80% defines a negative change in PCT result representing a lower risk for 28-day all-cause mortality of patients diagnosed with severe sepsis or septic shock.       Lactic Acid, Plasma [[id number]]  (Normal) Collected: 09/02/24 1050    Specimen: Blood Updated: 09/02/24 1124     Lactate 1.3 mmol/L     High Sensitivity Troponin T [[id number]]  (Normal) Collected: 09/02/24 1050    Specimen: Blood Updated: 09/02/24 1123     HS Troponin T 8 ng/L     Narrative:      High Sensitive Troponin T Reference Range:  <14.0 ng/L- Negative Female for AMI  <22.0 ng/L- Negative Male for AMI  >=14 - Abnormal Female indicating possible myocardial injury.  >=22 - " Abnormal Male indicating possible myocardial injury.   Clinicians would have to utilize clinical acumen, EKG, Troponin, and serial changes to determine if it is an Acute Myocardial Infarction or myocardial injury due to an underlying chronic condition.         BNP [[id number]]  (Normal) Collected: 09/02/24 1050    Specimen: Blood Updated: 09/02/24 1123     proBNP 46.3 pg/mL     Narrative:      This assay is used as an aid in the diagnosis of individuals suspected of having heart failure. It can be used as an aid in the diagnosis of acute decompensated heart failure (ADHF) in patients presenting with signs and symptoms of ADHF to the emergency department (ED). In addition, NT-proBNP of <300 pg/mL indicates ADHF is not likely.    Age Range Result Interpretation  NT-proBNP Concentration (pg/mL:      <50             Positive            >450                   Gray                 300-450                    Negative             <300    50-75           Positive            >900                  Gray                300-900                  Negative            <300      >75             Positive            >1800                  Gray                300-1800                  Negative            <300    COVID-19 and FLU A/B PCR, 1 HR TAT - Swab, Nasopharynx [[id number]]  (Normal) Collected: 09/02/24 1053    Specimen: Swab from Nasopharynx Updated: 09/02/24 1120     COVID19 Not Detected     Influenza A PCR Not Detected     Influenza B PCR Not Detected    Narrative:      Fact sheet for providers: https://www.fda.gov/media/605588/download    Fact sheet for patients: https://www.fda.gov/media/959876/download    Test performed by PCR.    Comprehensive Metabolic Panel [[id number]] Collected: 09/02/24 1050    Specimen: Blood Updated: 09/02/24 1119     Glucose 98 mg/dL      BUN 19 mg/dL      Creatinine 0.90 mg/dL      Sodium 139 mmol/L      Potassium 4.3 mmol/L      Chloride 105 mmol/L      CO2 25.1 mmol/L      Calcium 9.1 mg/dL      Total  Protein 7.2 g/dL      Albumin 4.3 g/dL      ALT (SGPT) 15 U/L      AST (SGOT) 16 U/L      Alkaline Phosphatase 86 U/L      Total Bilirubin 0.5 mg/dL      Globulin 2.9 gm/dL      A/G Ratio 1.5 g/dL      BUN/Creatinine Ratio 21.1     Anion Gap 8.9 mmol/L      eGFR 87.4 mL/min/1.73     Narrative:      GFR Normal >60  Chronic Kidney Disease <60  Kidney Failure <15    The GFR formula is only valid for adults with stable renal function between ages 18 and 70.    Lipase [[id number]]  (Normal) Collected: 09/02/24 1050    Specimen: Blood Updated: 09/02/24 1119     Lipase 16 U/L     Magnesium [[id number]]  (Normal) Collected: 09/02/24 1050    Specimen: Blood Updated: 09/02/24 1119     Magnesium 2.0 mg/dL     CBC & Differential [[id number]]  (Abnormal) Collected: 09/02/24 1050    Specimen: Blood Updated: 09/02/24 1105    Narrative:      The following orders were created for panel order CBC & Differential.  Procedure                               Abnormality         Status                     ---------                               -----------         ------                     CBC Auto Differential[[id number]]        Abnormal            Final result                 Please view results for these tests on the individual orders.    CBC Auto Differential [[id number]]  (Abnormal) Collected: 09/02/24 1050    Specimen: Blood Updated: 09/02/24 1105     WBC 4.86 10*3/mm3      RBC 4.29 10*6/mm3      Hemoglobin 13.9 g/dL      Hematocrit 41.6 %      MCV 97.0 fL      MCH 32.4 pg      MCHC 33.4 g/dL      RDW 13.4 %      RDW-SD 47.9 fl      MPV 11.0 fL      Platelets 165 10*3/mm3      Neutrophil % 59.7 %      Lymphocyte % 27.4 %      Monocyte % 8.6 %      Eosinophil % 3.3 %      Basophil % 0.4 %      Immature Grans % 0.6 %      Neutrophils, Absolute 2.90 10*3/mm3      Lymphocytes, Absolute 1.33 10*3/mm3      Monocytes, Absolute 0.42 10*3/mm3      Eosinophils, Absolute 0.16 10*3/mm3      Basophils, Absolute 0.02 10*3/mm3      Immature Grans,  Absolute 0.03 10*3/mm3      nRBC 0.0 /100 WBC           CT Angiogram Head    Result Date: 9/2/2024  Less than 50% stenosis bilateral internal carotid arteries.  Hypoplastic or very diseased left A1 segment.    CTDI: DLP:3623.85 mGy.cm   This report was signed and finalized on 9/2/2024 1:13 PM by Ruth Velasquez MD.      CT Angiogram Neck    Result Date: 9/2/2024  Less than 50% stenosis bilateral internal carotid arteries.  Hypoplastic or very diseased left A1 segment.    CTDI: DLP:3623.85 mGy.cm   This report was signed and finalized on 9/2/2024 1:13 PM by Ruth Velasquez MD.      CT CEREBRAL PERFUSION WITH & WITHOUT CONTRAST    Result Date: 9/2/2024  No evidence of core infarct or ischemic penumbra. No evidence of large vessel occlusion.      CTDI: DLP:3623.85 mGy.cm     This study was performed with techniques to keep radiation doses as low as reasonably achievable (ALARA). Individualized dose reduction techniques using automated exposure control or adjustment of mA and/or kV according to the patient size were employed.   This report was signed and finalized on 9/2/2024 1:00 PM by Ruth Velasquez MD.      CT Head Without Contrast    Result Date: 9/2/2024  Atrophy  without acute process.     CTDI: 29.81 mGy DLP:570.98 mGy.cm  This report was signed and finalized on 9/2/2024 11:59 AM by Ruth Velasquez MD.      XR Chest 1 View    Result Date: 9/2/2024  No acute cardiopulmonary process.      This report was signed and finalized on 9/2/2024 11:40 AM by Ruth Velasquez MD.     Results for orders placed during the hospital encounter of 09/02/24    Adult Transthoracic Echo Complete W/ Cont if Necessary Per Protocol (With Agitated Saline)    Interpretation Summary    Left ventricular systolic function is normal. Calculated left ventricular EF = 59.3% Left ventricular ejection fraction appears to be 56 - 60%. Left ventricular diastolic function was normal.    Normal right ventricular size and function.    Mild mitral valve regurgitation  is present.    Significant calcification and sclerosis of the aortic valve without stenosis.    Saline test results are negative for right to left atrial level shunt.            Assessment/Plan     Assessment/Plan:  Dizzy, unspecified- explains light headedness more than room spinning sensation  -numbness in the left side of the cheek is persistent since then  -aspirin 81 daily- can increase to 325- patient adamant and refusing other antithrombotics  -okay for lipitor 40 daily  -left side of the head- shooting neck.   -MRI brain- cannot be done due to pacemaker incompatbility at [facility]  -repeat CT head - if negative can be discharged with a diagnosis of minor TIA,  -outpatient refferal for sleep study     Likely discharge today    Kaden Link MD  09/03/24  10:39 EDT

## 2024-09-03 NOTE — DISCHARGE SUMMARY
"    [facility]      Name:  Lizandro Culp   Age:  79 y.o.  Sex:  male  :  1945  MRN:  [id number]   Visit Number:  [id number]    Admission Date:  2024  Date of Discharge:  9/3/2024  Primary Care Physician:  Dorcas Figueroa MD    Important issues to note:    -Patient admitted due to dizziness with suspected underlying TIA.  CT of head negative x 2.  Unable to perform MRI due to pacemaker at this facility.  -Echo did note EF 56 to 60% with significant calcification and sclerosis of the aortic valve without stenosis with saline test negative.  Brilinta was initially recommended.  Patient refused, stating that all anticoagulation medications cause him to \"pass out.\"  Patient agreeable to aspirin only.  Risk and benefits of medication discussed.  Patient also has significant side effects of myalgias with statin and does not wish to take at this time.  -Patient to follow-up with neurology and cardiology outpatient.  -Strict return precautions given.    Discharge Diagnoses:     Dizziness, suspect underlying TIA POA  History of sick sinus syndrome status post pacemaker placement  Coronary artery disease status post PCI x 7  History of prostate cancer    Problem List:     Active Hospital Problems    Diagnosis  POA    **Dizzy [R42]  Yes      Resolved Hospital Problems   No resolved problems to display.     Presenting Problem:    Chief Complaint   Patient presents with    Dizziness    Numbness     Dizziness and facial numbness started this morning.  Took 2 NTG this morning, denies any episodes of chest pain or SOA      Consults:     Consulting Physician(s)                     None              Procedures Performed:        History of presenting illness/Hospital Course:    Mr. Mccracken is a 79-year-old male with pertinent past medical history of sick sinus syndrome status post pacemaker placement, coronary artery disease status post PCI x 7, and prostate cancer who " "presented to emergency department due to dizziness while walking around at [facility] around 1030 this morning.  Patient denied associated nausea, vomiting, chest pain, or shortness of air.  He did endorse left facial and upper extremity numbness as well.  Denied unilateral weakness.  He does follow with Dr. Sneed cardiology.     Upon ED presentation patient hemodynamically stable on room air.  Labs largely unremarkable.  CT of head/CTA of head and neck with less than 50% stenosis bilateral internal carotid arteries, perfusion scan with no evidence of core infarct.  Neurology consulted.  Hospitalist consulted for further medical management.  Unable to perform MRI at current facility due to pacemaker.  Patient intolerant to statins in the past.  Neurology suggested Brilinta/aspirin/echo.  Device interrogation ordered and reviewed.  Patient unable to undergo MRI due to pacemaker at this facility.  CT of head reordered 24 hours afterwards.  CT with no acute findings.  Neurology final recommendations included aspirin 325 mg daily as patient refuses to take Brilinta or other anticoagulants stating they cause him to\" pass out\".  Patient also does not wish to take any statins at this time as he has significant myalgias in the past.  Patient will follow-up with cardiology and neurology outpatient.  Advised if worsening dizziness or syncopal episodes to return to nearest emergency department.  Otherwise reassuring labs and vitals noted.  Patient was monitored on telemetry with no further abnormalities noted.  No further dizziness noted.  Echo saline test negative with EF 56 to 60% with significant calcification and sclerosis of the aortic valve without stenosis.    Vital Signs:    Temp:  [97.4 °F (36.3 °C)-98.4 °F (36.9 °C)] 97.7 °F (36.5 °C)  Heart Rate:  [60-62] 60  Resp:  [16-18] 16  BP: (117-145)/(75-89) 119/75    Physical Exam:    General Appearance:  Alert and cooperative.  Middle-age male.  Well-appearing.   Head:  " Atraumatic and normocephalic.   Eyes: Conjunctivae and sclerae normal, no icterus. No pallor.   Ears:  Ears with no abnormalities noted.   Throat: No oral lesions, no thrush, oral mucosa moist.   Neck: Supple, trachea midline, no thyromegaly.   Back:   No kyphoscoliosis present. No tenderness to palpation.   Lungs:   Breath sounds heard bilaterally equally.  No crackles or wheezing. No Pleural rub or bronchial breathing.  On room air unlabored.   Heart:  Normal S1 and S2, no murmur, no gallop, no rub. No JVD.   Abdomen:   Normal bowel sounds, no masses, no organomegaly. Soft, nontender, nondistended, no rebound tenderness.   Extremities: Supple, no edema, no cyanosis, no clubbing.   Pulses: Pulses palpable bilaterally.   Skin: No bleeding or rash.   Neurologic: Alert and oriented x 3. No facial asymmetry. Moves all four limbs. No tremors.     Pertinent Lab Results:     Results from last 7 days   Lab Units 09/03/24  0716 09/02/24  1050   SODIUM mmol/L 140 139   POTASSIUM mmol/L 4.1 4.3   CHLORIDE mmol/L 107 105   CO2 mmol/L 21.4* 25.1   BUN mg/dL 13 19   CREATININE mg/dL 0.82 0.90   CALCIUM mg/dL 8.7 9.1   BILIRUBIN mg/dL  --  0.5   ALK PHOS U/L  --  86   ALT (SGPT) U/L  --  15   AST (SGOT) U/L  --  16   GLUCOSE mg/dL 103* 98     Results from last 7 days   Lab Units 09/03/24  0716 09/02/24  1050   WBC 10*3/mm3 4.77 4.86   HEMOGLOBIN g/dL 14.2 13.9   HEMATOCRIT % 41.8 41.6   PLATELETS 10*3/mm3 162 165     Results from last 7 days   Lab Units 09/02/24  1235   INR  0.97     Results from last 7 days   Lab Units 09/02/24  1050   HSTROP T ng/L 8     Results from last 7 days   Lab Units 09/02/24  1050   PROBNP pg/mL 46.3         Results from last 7 days   Lab Units 09/02/24  1050   LIPASE U/L 16               Pertinent Radiology Results:    Imaging Results (All)       Procedure Component Value Units Date/Time    CT Head Without Contrast [[id number]] Collected: 09/03/24 1427     Updated: 09/03/24 1433    Narrative:       PROCEDURE: CT HEAD WO CONTRAST-     HISTORY: neuro follow up; R20.0-Anesthesia of skin; R20.2-Paresthesia of  skin     COMPARISON: 1 day prior.     TECHNIQUE: Multiple axial CT images were performed from the foramen  magnum to the vertex without enhancement.     FINDINGS: There is no CT evidence of acute intracranial hemorrhage.  There is moderate age-appropriate atrophy. There is no mass, mass effect  or midline shift.  There is no hydrocephalus. The paranasal sinuses are  clear. Bone windows reveal no acute osseous abnormalities. There has  been no significant interval change from the prior exam.       Impression:      No significant interval change.              DLP: 650.42 mGy.cm  CTDI: 32.69 mGy        This study was performed with techniques to keep radiation doses as low  as reasonably achievable (ALARA). Individualized dose reduction  techniques using automated exposure control or adjustment of mA and/or  kV according to the patient size were employed.              Images were reviewed, interpreted, and dictated by Dr. Ruth Velasquez MD  Transcribed by Winnie Bonilla PA-C.     This report was signed and finalized on 9/3/2024 2:31 PM by Ruth Velasquez MD.       CT Angiogram Head [[id number]] Collected: 09/02/24 1301     Updated: 09/02/24 1315    Narrative:      PROCEDURE: CT ANGIOGRAM HEAD-, CT ANGIOGRAM NECK-     HISTORY: Neuro deficit, acute, stroke suspected     TECHNIQUE: Thin section axial CT with IV contrast supplemented with  multiplanar 3 D reconstructions of the head and neck. This study was  performed with techniques to keep radiation doses as low as reasonably  achievable, (ALARA). Individualized dose reduction techniques using  automated exposure control or adjustment of mA and/or kV according to  the patient size were employed.     FINDINGS:     Head CT: The ventricles are enlarged indicating atrophy which is  prominent for age. There is no evidence of hemorrhage. No masses are  identified.  No  extra-axial fluid is seen. The sinuses are unremarkable.     CTA:     Aortic arch:  Arch shows no significant narrowing. Great vessel origins  are widely patent.     Right carotid: Calcified and noncalcified plaque causes less than 50%  stenosis of the right internal carotid artery.     Left carotid: Mild calcified and noncalcified plaque causes less than  50% stenosis.     Vertebrals: The vertebrals are patent. No significant stenosis is  present. System is left vertebral dominant. Small plaque identified at  the origin of the left vertebral artery. Moderate calcification noted at  the origin of the right vertebral artery.     The cranial circulation demonstrates a very hypoplastic or diseased left  A1 segment. The left JAVIER fills from the anterior communicating artery..  There is no significant stenosis, aneurysm, or occlusion.       Impression:      Less than 50% stenosis bilateral internal carotid arteries.     Hypoplastic or very diseased left A1 segment.           CTDI:  DLP:3623.85 mGy.cm        This report was signed and finalized on 9/2/2024 1:13 PM by Ruth Velasquez MD.       CT Angiogram Neck [[id number]] Collected: 09/02/24 1301     Updated: 09/02/24 1315    Narrative:      PROCEDURE: CT ANGIOGRAM HEAD-, CT ANGIOGRAM NECK-     HISTORY: Neuro deficit, acute, stroke suspected     TECHNIQUE: Thin section axial CT with IV contrast supplemented with  multiplanar 3 D reconstructions of the head and neck. This study was  performed with techniques to keep radiation doses as low as reasonably  achievable, (ALARA). Individualized dose reduction techniques using  automated exposure control or adjustment of mA and/or kV according to  the patient size were employed.     FINDINGS:     Head CT: The ventricles are enlarged indicating atrophy which is  prominent for age. There is no evidence of hemorrhage. No masses are  identified.  No extra-axial fluid is seen. The sinuses are unremarkable.     CTA:     Aortic arch:  Arch  shows no significant narrowing. Great vessel origins  are widely patent.     Right carotid: Calcified and noncalcified plaque causes less than 50%  stenosis of the right internal carotid artery.     Left carotid: Mild calcified and noncalcified plaque causes less than  50% stenosis.     Vertebrals: The vertebrals are patent. No significant stenosis is  present. System is left vertebral dominant. Small plaque identified at  the origin of the left vertebral artery. Moderate calcification noted at  the origin of the right vertebral artery.     The cranial circulation demonstrates a very hypoplastic or diseased left  A1 segment. The left JAVIER fills from the anterior communicating artery..  There is no significant stenosis, aneurysm, or occlusion.       Impression:      Less than 50% stenosis bilateral internal carotid arteries.     Hypoplastic or very diseased left A1 segment.           CTDI:  DLP:3623.85 mGy.cm        This report was signed and finalized on 9/2/2024 1:13 PM by Ruth Velasquez MD.       CT CEREBRAL PERFUSION WITH & WITHOUT CONTRAST [[id number]] Collected: 09/02/24 1258     Updated: 09/02/24 1302    Narrative:      HEAD CT PERFUSION WITH CONTRAST    9/2/2024 12:29 PM      HISTORY: Neuro deficit, acute, stroke suspected.     COMPARISON: None.     TECHNIQUE: Multiple axial CT images were performed from the foramen  magnum to the vertex. Limited dynamic postcontrast images were obtained.  Calculations of cerebral blood flow, mean transit time, cerebral blood  volume were performed and evaluated. This study was performed with  techniques to keep radiation doses as low as reasonably achievable,  (ALARA). Individualized dose reduction techniques using automated  exposure control or adjustment of mA and/or kV according to the patient  size were employed.     FINDINGS:     rCBF, 30% volume: 0 ml   Tmax > 6 sec :  0 ml     Mismatch volume: 0 ml.  Mismatch ratio: none     Comments:  There is no evidence or core  infarct or ischemic penumbra.       Impression:      No evidence of core infarct or ischemic penumbra. No  evidence of large vessel occlusion.                 CTDI:  DLP:3623.85 mGy.cm              This study was performed with techniques to keep radiation doses as low  as reasonably achievable (ALARA). Individualized dose reduction  techniques using automated exposure control or adjustment of mA and/or  kV according to the patient size were employed.        This report was signed and finalized on 9/2/2024 1:00 PM by Ruth Velasquez MD.       CT Head Without Contrast [[id number]] Collected: 09/02/24 1158     Updated: 09/02/24 1201    Narrative:      PROCEDURE: CT HEAD WO CONTRAST-     HISTORY: History of brain bleed with headache, dizziness, eval  intracranial hemorrhage     COMPARISON: None.     TECHNIQUE: Multiple axial CT images were performed from the foramen  magnum to the vertex. Individualized dose reduction techniques using  automated exposure control or adjustment of mA and/or kV according to  the patient size were employed.     FINDINGS: There is moderate, age-appropriate generalized cerebral  atrophy. The ventricles are enlarged. There is no evidence of edema or  hemorrhage.  No masses are identified. No extra-axial fluid is seen. The  paranasal sinuses are unremarkable.       Impression:      Atrophy  without acute process.              CTDI: 29.81 mGy  DLP:570.98 mGy.cm     This report was signed and finalized on 9/2/2024 11:59 AM by Ruth Velasquez MD.       XR Chest 1 View [[id number]] Collected: 09/02/24 1139     Updated: 09/02/24 1142    Narrative:      PROCEDURE: XR CHEST 1 VW-     HISTORY: Dizziness, eval pneumonia     COMPARISON: December 15, 2023..     FINDINGS: The heart is normal in size. The lungs are clear. The  mediastinum is unremarkable. There is no pneumothorax.  There are no  acute osseous abnormalities. Decreased inspiratory effort noted. 2-lead  pacemaker again noted. Aortic mural  calcifications identified.       Impression:      No acute cardiopulmonary process.                 This report was signed and finalized on 9/2/2024 11:40 AM by uRth Velasquez MD.               Echo:    Results for orders placed during the hospital encounter of 09/02/24    Adult Transthoracic Echo Complete W/ Cont if Necessary Per Protocol (With Agitated Saline)    Interpretation Summary    Left ventricular systolic function is normal. Calculated left ventricular EF = 59.3% Left ventricular ejection fraction appears to be 56 - 60%. Left ventricular diastolic function was normal.    Normal right ventricular size and function.    Mild mitral valve regurgitation is present.    Significant calcification and sclerosis of the aortic valve without stenosis.    Saline test results are negative for right to left atrial level shunt.    Condition on Discharge:      Stable.    Code status during the hospital stay:    Code Status and Medical Interventions: CPR (Attempt to Resuscitate); Full Support   Ordered at: 09/02/24 1422     Level Of Support Discussed With:    Patient     Code Status (Patient has no pulse and is not breathing):    CPR (Attempt to Resuscitate)     Medical Interventions (Patient has pulse or is breathing):    Full Support     Discharge Disposition:    Home or Self Care    Discharge Medications:       Discharge Medications        New Medications        Instructions Start Date   aspirin 325 MG EC tablet  Replaces: Aspirin 81 MG capsule   325 mg, Oral, Daily             Changes to Medications        Instructions Start Date   tamsulosin 0.4 MG capsule 24 hr capsule  Commonly known as: FLOMAX  What changed: when to take this   0.4 mg, Oral, Nightly             Continue These Medications        Instructions Start Date   chlorhexidine 0.12 % solution  Commonly known as: PERIDEX   SWISH AND SPIT IN MOUTH AT NIGHT AS DIRECTED      DIGESTIVE ENZYMES PO   1 tablet, Oral, 2 Times Daily      KRILL OIL PO   2 tablets, Oral,  Daily      nitroglycerin 0.4 MG SL tablet  Commonly known as: NITROSTAT   0.4 mg, Sublingual, Every 5 Minutes PRN, do not exceed a total of 3 doses in 15 minutes      Probiotic Daily capsule   1 tablet, Oral, Daily      PROSTATE SUPPORT PO   1 tablet, Oral, 2 Times Daily, Super Beta Prostate      SAW PALMETTO PO   1 tablet, Oral, Daily      VITAMIN C PO   1,000 mg, Oral, Daily             Stop These Medications      Aspirin 81 MG capsule  Replaced by: aspirin 325 MG EC tablet     VITAMIN D3 PO            Discharge Diet:     Diet Instructions       Diet: Cardiac Diets; Healthy Heart (2-3 Na+); Thin (IDDSI 0)      Discharge Diet: Cardiac Diets    Cardiac Diet: Healthy Heart (2-3 Na+)    Fluid Consistency: Thin (IDDSI 0)          Activity at Discharge:     Activity Instructions       Activity as Tolerated            Follow-up Appointments:     Follow-up Information       Dorcas Figueroa MD Follow up in 1 week(s).    Specialty: Internal Medicine  Contact information:  [address] [facility] [id number] [phone]               Ren Sneed III, MD Follow up in 1 month(s).    Specialties: Cardiology, Interventional Cardiology  Contact information:  [address] [facility]  [address]  [facility] [id number] [phone]               [facility] Follow up in 2 week(s).    Contact information:  [address] [facility] [address] [phone]                         Future Appointments   Date Time Provider Department Center   10/23/2024 11:30 AM Ren Sneed III, MD [facility]   6/2/2025 10:30 AM Miki Mckeon, APRN [facility] None     Test Results Pending at Discharge:           ROMAIN Syed  09/03/24  14:54 EDT    Time: I spent 45 minutes on this discharge activity which included: face-to-face encounter with the patient, reviewing the data in the system, coordination of the care with the nursing staff as well as consultants, documentation, and entering orders.      Dictated utilizing Dragon dictation.

## 2024-09-03 NOTE — THERAPY DISCHARGE NOTE
Patient Name: Lizandro Culp  : 1945    MRN: [id number]                              Today's Date: 9/3/2024       Admit Date: 2024    Visit Dx:     ICD-10-CM ICD-9-CM   1. Numbness and tingling in left arm  R20.0 782.0    R20.2      Patient Active Problem List   Diagnosis    Encounter for screening for malignant neoplasm of colon    Gastroesophageal reflux disease without esophagitis    Coronary artery disease involving coronary bypass graft of native heart without angina pectoris    SSS (sick sinus syndrome)    Unstable angina    Prostate cancer    Dizzy     Past Medical History:   Diagnosis Date    Bradycardia     s/p pacemaker    CAD (coronary artery disease)     Hearing difficulty     Heart attack 2017    Hiatal hernia     Hyperthyroidism     Orthostatic hypotension     Prostate cancer     Refusal of statin medication by patient     Refused pneumococcal vaccination     Sick sinus syndrome     Skin cancer     basal cell cancer on right ear    Snores     Trigger finger     Left thumb     Past Surgical History:   Procedure Laterality Date    BACK SURGERY      fusion/deiscectomy    CARDIAC CATHETERIZATION      2023 PER DR. LAWRENCE    CARDIAC CATHETERIZATION N/A 2023    Procedure: Left Heart Cath;  Surgeon: Nain Lawrence MD;  Location: [facility] CATH INVASIVE LOCATION;  Service: Cardiovascular;  Laterality: N/A;  Please schedule with Interventionalist.    COLONOSCOPY          CORONARY ANGIOPLASTY  2003    x6 stents    CORONARY ANGIOPLASTY WITH STENT PLACEMENT  2017    x 3 stent    CYBERKNIFE  10/20/2023    prostate/SV    INGUINAL HERNIA REPAIR Bilateral     INSERT / REPLACE / REMOVE PACEMAKER      PROSTATE FIDUCIAL MARKER PLACEMENT  2023      General Information       Row Name 24 1325          Physical Therapy Time and Intention    Document Type discharge evaluation/summary  -HW     Mode of Treatment physical therapy  -       Row Name 24 1325          General  Information    Patient Profile Reviewed yes  -HW     Prior Level of Function independent:;community mobility  -     Existing Precautions/Restrictions fall  -     Barriers to Rehab medically complex  -       Row Name 09/03/24 1325          Living Environment    People in Home spouse  -       Row Name 09/03/24 1325          Home Main Entrance    Number of Stairs, Main Entrance six  -     Stair Railings, Main Entrance railings on both sides of stairs  -       Row Name 09/03/24 1325          Stairs Within Home, Primary    Number of Stairs, Within Home, Primary none  -       Row Name 09/03/24 1325          Cognition    Orientation Status (Cognition) oriented x 4  -               User Key  (r) = Recorded By, (t) = Taken By, (c) = Cosigned By      Initials Name Provider Type    Marisela Virk PT Physical Therapist                   Mobility       Row Name 09/03/24 1326          Bed Mobility    Bed Mobility bed mobility (all) activities  -     All Activities, [place] (Bed Mobility) modified independence  -     Assistive Device (Bed Mobility) head of bed elevated  -       Row Name 09/03/24 1326          Sit-Stand Transfer    Sit-Stand [place] (Transfers) independent  -     Assistive Device (Sit-Stand Transfers) other (see comments)  -     Comment, (Sit-Stand Transfer) gait belt  -       Row Name 09/03/24 1326          Gait/Stairs (Locomotion)    [place] Level (Gait) independent  -     Assistive Device (Gait) other (see comments)  gait belt  -     Patient was able to Ambulate yes  -     Distance in Feet (Gait) 136  -HW     Deviations/Abnormal Patterns (Gait) gait speed decreased  -     [place] Level (Stairs) not tested  -               User Key  (r) = Recorded By, (t) = Taken By, (c) = Cosigned By      Initials Name Provider Type    Marisela Virk PT Physical Therapist                   Obj/Interventions       Row Name 09/03/24 1327          Range of Motion  Comprehensive    General Range of Motion bilateral lower extremity ROM WFL  -       Row Name 09/03/24 1327          Strength Comprehensive (MMT)    General Manual Muscle Testing (MMT) Assessment no strength deficits identified  -     Comment, General Manual Muscle Testing (MMT) Assessment BLE MMT WNL  -       Row Name 09/03/24 1327          Balance    Balance Assessment sitting static balance;standing dynamic balance;sitting dynamic balance;sit to stand dynamic balance;standing static balance  -     Static Sitting Balance independent  -     Dynamic Sitting Balance independent  -     Position, Sitting Balance unsupported;sitting edge of bed  -     Sit to Stand Dynamic Balance independent  -     Static Standing Balance independent  -     Dynamic Standing Balance independent  -     Position/Device Used, Standing Balance unsupported  -       Row Name 09/03/24 1327          Sensory Assessment (Somatosensory)    Sensory Assessment (Somatosensory) LE sensation intact  -               User Key  (r) = Recorded By, (t) = Taken By, (c) = Cosigned By      Initials Name Provider Type     Marisela Adames, PT Physical Therapist                   Goals/Plan    No documentation.                  Clinical Impression       [facility] Name 09/03/24 1327          Pain    Pretreatment Pain Rating 0/10 - no pain  -     Posttreatment Pain Rating 0/10 - no pain  -[facility] Name 09/03/24 1327          Plan of Care Review    Plan of Care Reviewed With patient  -     Progress no change  -     Outcome Evaluation Pt participated in PT initial evaluation this date. Pt presents supine in bed, pleasant and agreeable to PT evaluation. Pt AOx4, denies reports of pain at rest. At baseline, pt lives at home with his wife in a [facility] with 6 NESTOR. Pt reports he is normally (I) with all daily activities including community ambulation without AD. Pt denies reports of falls at home. Pt performed supine to sit on EOB with mod (I). Pt  performed STS and ambulation x136' (I) with no AD. Pt demonstrates decreased nuvia during ambulation, no LOB noted. Following evaluation, pt left seated in bedside chair with call light and all needs within reach. Pt appears to be at functional baseline, no skilled PT interventions indicated at this time.   Once medically stable, recommend pt to d/c home with support from wife and use a SPC for ambulation. PT will sign off.  -       Row Name 09/03/24 1327          Vital Signs    Pre Systolic BP Rehab 119  -HW     Pre Treatment Diastolic BP 75  -HW     Pretreatment Heart Rate (beats/min) 63  -HW     Pre SpO2 (%) 98  -HW     O2 Delivery Pre Treatment room air  -     O2 Delivery Intra Treatment room air  -HW     O2 Delivery Post Treatment room air  -HW     Pre Patient Position Supine  -     Intra Patient Position Standing  -     Post Patient Position Sitting  -       Row Name 09/03/24 1327          Positioning and Restraints    Pre-Treatment Position in bed  -     Post Treatment Position chair  -HW     In Chair sitting;call light within reach;encouraged to call for assist  -               User Key  (r) = Recorded By, (t) = Taken By, (c) = Cosigned By      Initials Name Provider Type     Marisela Adames, PT Physical Therapist                   Outcome Measures       Row Name 09/03/24 2793          How much help from another person do you currently need...    Turning from your back to your side while in flat bed without using bedrails? 4  -HW     Moving from lying on back to sitting on the side of a flat bed without bedrails? 4  -HW     Moving to and from a bed to a chair (including a wheelchair)? 4  -HW     Standing up from a chair using your arms (e.g., wheelchair, bedside chair)? 4  -HW     Climbing 3-5 steps with a railing? 4  -HW     To walk in hospital room? 4  -HW     AM-PAC 6 Clicks Score (PT) 24  -     Highest Level of Mobility Goal 8 --> Walked 250 feet or more  -       Row Name 09/03/24 6729  09/03/24 1150       Functional Assessment    Outcome Measure Options AM-PAC 6 Clicks Basic Mobility (PT)  - AM-PAC 6 Clicks Daily Activity (OT)  -              User Key  (r) = Recorded By, (t) = Taken By, (c) = Cosigned By      Initials Name Provider Type     Juanita Gray Occupational Therapist     Marisela Adames PT Physical Therapist                  Physical Therapy Education       Title: PT OT SLP Therapies (In Progress)       Topic: Physical Therapy (In Progress)       Point: Mobility training (Done)       Learning Progress Summary             Patient Acceptance, E,TB, VU by  at 9/3/2024 0032    Comment: role of PT during IP admission                         Point: Home exercise program (Not Started)       Learner Progress:  Not documented in this visit.              Point: Body mechanics (Not Started)       Learner Progress:  Not documented in this visit.              Point: Precautions (Not Started)       Learner Progress:  Not documented in this visit.                              User Key       Initials Effective Dates Name Provider Type Discipline     11/29/23 -  Marisela Adames PT Physical Therapist PT                  PT Recommendation and Plan     Plan of Care Reviewed With: patient  Progress: no change  Outcome Evaluation: Pt participated in PT initial evaluation this date. Pt presents supine in bed, pleasant and agreeable to PT evaluation. Pt AOx4, denies reports of pain at rest. At baseline, pt lives at home with his wife in a [place] with 6 NESTOR. Pt reports he is normally (I) with all daily activities including community ambulation without AD. Pt denies reports of falls at home. Pt performed supine to sit on EOB with mod (I). Pt performed STS and ambulation x136' (I) with no AD. Pt demonstrates decreased nuvia during ambulation, no LOB noted. Following evaluation, pt left seated in bedside chair with call light and all needs within reach. Pt appears to be at functional baseline, no skilled PT  interventions indicated at this time.   Once medically stable, recommend pt to d/c home with support from wife and use a SPC for ambulation. PT will sign off.     Time Calculation:   PT Evaluation Complexity  History, PT Evaluation Complexity: 1-2 personal factors and/or comorbidities  Examination of Body Systems (PT Eval Complexity): 1-2 elements  Clinical Decision Making (PT Evaluation Complexity): low complexity     PT Charges       Row Name 09/03/24 1333             Time Calculation    Start Time 1104  -HW      PT Received On 09/03/24  -      PT Goal Re-Cert Due Date 09/13/24  -         Untimed Charges    PT Eval/Re-eval Minutes 40  -HW         Total Minutes    Untimed Charges Total Minutes 40  -HW       Total Minutes 40  -HW                User Key  (r) = Recorded By, (t) = Taken By, (c) = Cosigned By      Initials Name Provider Type     Marisela Adames, PT Physical Therapist                  Therapy Charges for Today       Code Description Service Date Service Provider Modifiers Qty    [id number]  PT EVAL LOW COMPLEXITY 3 9/3/2024 Marisela Adames, PT GP 1            PT G-Codes  Outcome Measure Options: AM-PAC 6 Clicks Basic Mobility (PT)  AM-PAC 6 Clicks Score (PT): 24  AM-PAC 6 Clicks Score (OT): 24    PT Discharge Summary  Anticipated Discharge Disposition (PT): home    Marisela Adames PT  9/3/2024

## 2024-09-03 NOTE — THERAPY DISCHARGE NOTE
Acute Care - Occupational Therapy Discharge  [facility]    Patient Name: Lizandro Culp  : 1945    MRN: [id number]                              Today's Date: 9/3/2024       Admit Date: 2024    Visit Dx:     ICD-10-CM ICD-9-CM   1. Numbness and tingling in left arm  R20.0 782.0    R20.2      Patient Active Problem List   Diagnosis    Encounter for screening for malignant neoplasm of colon    Gastroesophageal reflux disease without esophagitis    Coronary artery disease involving coronary bypass graft of native heart without angina pectoris    SSS (sick sinus syndrome)    Unstable angina    Prostate cancer    Dizzy     Past Medical History:   Diagnosis Date    Bradycardia     s/p pacemaker    CAD (coronary artery disease)     Hearing difficulty     Heart attack 2017    Hiatal hernia     Hyperthyroidism     Orthostatic hypotension     Prostate cancer     Refusal of statin medication by patient     Refused pneumococcal vaccination     Sick sinus syndrome     Skin cancer     basal cell cancer on right ear    Snores     Trigger finger     Left thumb     Past Surgical History:   Procedure Laterality Date    BACK SURGERY      fusion/deiscectomy    CARDIAC CATHETERIZATION      2023 PER DR. LAWRENCE    CARDIAC CATHETERIZATION N/A 2023    Procedure: Left Heart Cath;  Surgeon: Nain Lawrence MD;  Location:  [facility] CATH INVASIVE LOCATION;  Service: Cardiovascular;  Laterality: N/A;  Please schedule with Interventionalist.    COLONOSCOPY          CORONARY ANGIOPLASTY  2003    x6 stents    CORONARY ANGIOPLASTY WITH STENT PLACEMENT  2017    x 3 stent    CYBERKNIFE  10/20/2023    prostate/SV    INGUINAL HERNIA REPAIR Bilateral     INSERT / REPLACE / REMOVE PACEMAKER      PROSTATE FIDUCIAL MARKER PLACEMENT  2023      General Information       Row Name 24 1140          OT Time and Intention    Document Type discharge evaluation/summary  -     Mode of Treatment occupational therapy  [facility]        Row Name 09/03/24 1140          General Information    Patient Profile Reviewed yes  -     Prior Level of Function independent:;ADL's;community mobility  -     Existing Precautions/Restrictions fall;pacemaker  -     Barriers to Rehab medically complex  -[facility] Name 09/03/24 1140          Living Environment    People in Home spouse  -[facility] Name 09/03/24 1140          Home Main Entrance    Number of Stairs, Main Entrance six  -     Stair Railings, Main Entrance railings on both sides of stairs  -       Row Name 09/03/24 1140          Stairs Within Home, Primary    Number of Stairs, Within Home, Primary none  -[facility] Name 09/03/24 1140          Cognition    Orientation Status (Cognition) oriented x 4  -               User Key  (r) = Recorded By, (t) = Taken By, (c) = Cosigned By      Initials Name Provider Type     Juanita Gray Occupational Therapist                   Mobility/ADL's       [facility] Name 09/03/24 1141          Bed Mobility    Bed Mobility bed mobility (all) activities  -     All Activities, [place] (Bed Mobility) modified independence  -     Assistive Device (Bed Mobility) head of bed elevated  -[facility] Name 09/03/24 1141          Transfers    Transfers sit-stand transfer  -[facility] Name 09/03/24 1141          Sit-Stand Transfer    Sit-Stand [place] (Transfers) independent  -[facility] Name 09/03/24 1141          Functional Mobility    Functional Mobility- Ind. Level standby assist  -     Functional Mobility- Device other (see comments)  gait belt  -     Functional Mobility-Distance (Feet) 136  -     Patient was able to Ambulate yes  -[facility] Name 09/03/24 1141          Activities of Daily Living    BADL Assessment/Intervention bathing;upper body dressing;lower body dressing;grooming;feeding;toileting  -[facility] Name 09/03/24 1141          Bathing Assessment/Intervention    [place] Level (Bathing) set up  -[facility] Name  09/03/24 1141          Upper Body Dressing Assessment/Training    [place] Level (Upper Body Dressing) independent  -AH       Row Name 09/03/24 1141          Lower Body Dressing Assessment/Training    [place] Level (Lower Body Dressing) independent  -AH       Row Name 09/03/24 1141          Grooming Assessment/Training    [place] Level (Grooming) independent  -AH       Row Name 09/03/24 1141          Self-Feeding Assessment/Training    [place] Level (Feeding) independent  -AH       Row Name 09/03/24 1141          Toileting Assessment/Training    [place] Level (Toileting) independent  -AH               User Key  (r) = Recorded By, (t) = Taken By, (c) = Cosigned By      Initials Name Provider Type    Juanita Cannon Occupational Therapist                   Obj/Interventions       Row Name 09/03/24 1141          Sensory Assessment (Somatosensory)    Sensory Assessment (Somatosensory) UE sensation intact  -     Sensory Assessment does report some numbness in his lip  -AH       Row Name 09/03/24 1141          Vision Assessment/Intervention    Visual Impairment/Limitations WFL;corrective lenses for reading  -AH       Row Name 09/03/24 1141          Range of Motion Comprehensive    General Range of Motion bilateral upper extremity ROM WNL  -AH       Row Name 09/03/24 1141          Strength Comprehensive (MMT)    Comment, General Manual Muscle Testing (MMT) Assessment BUE WNL  -AH               User Key  (r) = Recorded By, (t) = Taken By, (c) = Cosigned By      Initials Name Provider Type    Juanita Cannon Occupational Therapist                   Goals/Plan    No documentation.                  Clinical Impression       Row Name 09/03/24 1142          Pain Assessment    Pretreatment Pain Rating 0/10 - no pain  -     Posttreatment Pain Rating 0/10 - no pain  -     Pain Intervention(s) Repositioned;Ambulation/increased activity  -AH       Row Name 09/03/24 1142          Plan of Care Review     Plan of Care Reviewed With patient  -     Progress no change  -     Outcome Evaluation Pt seen for OT evaluation today.  Pt presented to the hospital with c/o LUE, LLE and facial numbness and dizziness.  Pt received supine in bed today and reports all symptoms have resolved except for slight numbness in his mouth.  Pt demonstrates good ROM and strength BUEs.  He was able to sit eob independently, stood independently and walked 136' with sba.  Orthostatic BP were completed as follows: supine 119/75, [id number]/87, cfoauwyb209/86.  Pt is independent with self care and functional mobility tasks.  He appears to be back to his baseline functional level and has no skilled OT needs at this time.  OT will sign off.  -       Row Name 09/03/24 1142          Therapy Assessment/Plan (OT)    Patient/Family Therapy Goal Statement (OT) d/c home  -     Criteria for Skilled Therapeutic Interventions Met (OT) no problems identified which require skilled intervention  -     Therapy Frequency (OT) evaluation only  -       Row Name 09/03/24 1142          Therapy Plan Review/Discharge Plan (OT)    Anticipated Discharge Disposition (OT) home  -       Row Name 09/03/24 1142          Vital Signs    Pre Systolic BP Rehab 119  -AH     Pre Treatment Diastolic BP 75  -AH     Intra Systolic BP Rehab 124  -AH     Intra Treatment Diastolic BP 87  -AH     Post Systolic BP Rehab 127  -AH     Post Treatment Diastolic BP 86  -AH     Pre Patient Position Supine  -     Intra Patient Position Sitting  -     Post Patient Position Standing  -       Row Name 09/03/24 1142          Positioning and Restraints    Pre-Treatment Position in bed  -AH     Post Treatment Position chair  -     In Chair sitting;call light within reach;encouraged to call for assist  -               User Key  (r) = Recorded By, (t) = Taken By, (c) = Cosigned By      Initials Name Provider Type    Juanita Cannon Occupational Therapist                    Outcome Measures       Row Name 09/03/24 1150          How much help from another is currently needed...    Putting on and taking off regular lower body clothing? 4  -AH     Bathing (including washing, rinsing, and drying) 4  -AH     Toileting (which includes using toilet bed pan or urinal) 4  -AH     Putting on and taking off regular upper body clothing 4  -AH     Taking care of personal grooming (such as brushing teeth) 4  -AH     Eating meals 4  -     AM-PAC 6 Clicks Score (OT) 24  -       Row Name 09/03/24 1150          Functional Assessment    Outcome Measure Options AM-PAC 6 Clicks Daily Activity (OT)  -               User Key  (r) = Recorded By, (t) = Taken By, (c) = Cosigned By      Initials Name Provider Type     Juanita Gray Occupational Therapist                  Occupational Therapy Education       Title: PT OT SLP Therapies (Done)       Topic: Occupational Therapy (Done)       Point: ADL training (Done)       Description:   Instruct learner(s) on proper safety adaptation and remediation techniques during self care or transfers.   Instruct in proper use of assistive devices.                  Learning Progress Summary             Patient Acceptance, E,TB, VU by  at 9/3/2024 1154    Comment: Role of OT                                         User Key       Initials Effective Dates Name Provider Type Discipline     06/16/21 -  Juanita Gray Occupational Therapist OT                  OT Recommendation and Plan  Therapy Frequency (OT): evaluation only  Plan of Care Review  Plan of Care Reviewed With: patient  Progress: no change  Outcome Evaluation: Pt seen for OT evaluation today.  Pt presented to the hospital with c/o LUE, LLE and facial numbness and dizziness.  Pt received supine in bed today and reports all symptoms have resolved except for slight numbness in his mouth.  Pt demonstrates good ROM and strength BUEs.  He was able to sit eob independently, stood independently and walked 136'  with sba.  Orthostatic BP were completed as follows: supine 119/75, pbhphpo117/87, wghscjnz661/86.  Pt is independent with self care and functional mobility tasks.  He appears to be back to his baseline functional level and has no skilled OT needs at this time.  OT will sign off.  Plan of Care Reviewed With: patient  Outcome Evaluation: Pt seen for OT evaluation today.  Pt presented to the hospital with c/o LUE, LLE and facial numbness and dizziness.  Pt received supine in bed today and reports all symptoms have resolved except for slight numbness in his mouth.  Pt demonstrates good ROM and strength BUEs.  He was able to sit eob independently, stood independently and walked 136' with sba.  Orthostatic BP were completed as follows: supine 119/75, zrftvlq348/87, dzfujikm936/86.  Pt is independent with self care and functional mobility tasks.  He appears to be back to his baseline functional level and has no skilled OT needs at this time.  OT will sign off.     Time Calculation:   Evaluation Complexity (OT)  Review Occupational Profile/Medical/Therapy History Complexity: brief/low complexity  Assessment, Occupational Performance/Identification of Deficit Complexity: 1-3 performance deficits  Clinical Decision Making Complexity (OT): problem focused assessment/low complexity  Overall Complexity of Evaluation (OT): low complexity     Time Calculation- OT       Row Name 09/03/24 1156             Time Calculation- OT    OT Start Time 1110  -      OT Received On 09/03/24  -AH         Untimed Charges    OT Eval/Re-eval Minutes 43  -AH         Total Minutes    Untimed Charges Total Minutes 43  -AH       Total Minutes 43  -AH                User Key  (r) = Recorded By, (t) = Taken By, (c) = Cosigned By      Initials Name Provider Type    Juanita Cannon Occupational Therapist                  Therapy Charges for Today       Code Description Service Date Service Provider Modifiers Qty    [id number]  OT EVAL LOW  COMPLEXITY 3 9/3/2024 Juanita Gray GO 1               OT Discharge Summary  Anticipated Discharge Disposition (OT): home    Juanita Gray  9/3/2024

## 2024-09-03 NOTE — CASE MANAGEMENT/SOCIAL WORK
Discharge Planning Assessment  [facility]     Patient Name: Lizandro Culp  MRN: [id number]  Today's Date: 9/3/2024    Admit Date: 9/2/2024    Plan: Home with family   Discharge Needs Assessment       Row Name 09/03/24 0836       Living Environment    People in Home spouse    Current Living Arrangements home    Potentially Unsafe Housing Conditions none    In the past 12 months has the electric, gas, oil, or water company threatened to shut off services in your home? No    Primary Care Provided by self    Provides Primary Care For no one    Able to Return to Prior Arrangements yes       Resource/Environmental Concerns    Resource/Environmental Concerns none    Transportation Concerns none       Transportation Needs    In the past 12 months, has lack of transportation kept you from medical appointments or from getting medications? no    In the past 12 months, has lack of transportation kept you from meetings, work, or from getting things needed for daily living? No       Food Insecurity    Within the past 12 months, you worried that your food would run out before you got the money to buy more. Never true    Within the past 12 months, the food you bought just didn't last and you didn't have money to get more. Never true       Transition Planning    Patient/Family Anticipates Transition to home with family    Patient/Family Anticipated Services at Transition none    Transportation Anticipated family or friend will provide       Discharge Needs Assessment    Readmission Within the Last 30 Days no previous admission in last 30 days    Equipment Currently Used at Home none    Concerns to be Addressed no discharge needs identified    Anticipated Changes Related to Illness none    Equipment Needed After Discharge none                   Discharge Plan       Row Name 09/03/24 0837       Plan    Plan Home with family    Patient/Family in Agreement with Plan yes    Plan Comments Met with patient at bedside.Verified  patient's address, phone number, contacts, physician and pharmacy. Patient has adequate transportation. Reports no financial or food insecurity. Patient lives with his spouse. He uses [facility]. Declines meds to bed. Patient does not use DME. He plans to return home once medically ready, states no needs at this time.    Final Discharge Disposition Code 01 - home or self-care                  Continued Care and Services - Admitted Since 9/2/2024    No active coordination exists for this encounter.          Demographic Summary       Row Name 09/03/24 0835       General Information    Admission Type observation    Arrived From emergency department    Required Notices Provided Observation Status Notice    Referral Source admission list    Reason for Consult discharge planning    Preferred Language English       Contact Information    Permission Granted to Share Info With                    Functional Status       Row Name 09/03/24 0836       Functional Status    Usual Activity Tolerance excellent    Current Activity Tolerance excellent       Physical Activity    On average, how many days per week do you engage in moderate to strenuous exercise (like a brisk walk)? 0 days    On average, how many minutes do you engage in exercise at this level? 0 min    Number of minutes of exercise per week 0       Assessment of Health Literacy    How often do you have someone help you read hospital materials? Never    How often do you have problems learning about your medical condition because of difficulty understanding written information? Never    How often do you have a problem understanding what is told to you about your medical condition? Never    How confident are you filling out medical forms by yourself? Extremely    Health Literacy Excellent       Functional Status, IADL    Medications independent    Meal Preparation independent    Housekeeping independent    Laundry independent    Shopping independent                    Psychosocial       Row Name 09/03/24 0836       Values/Beliefs    Spiritual, Cultural Beliefs, Uatsdin Practices, Values that Affect Care no       Mental Health    Little Interest or Pleasure in Doing Things 0-->not at all    Feeling Down, Depressed or Hopeless 0-->not at all       Stress    Do you feel stress - tense, restless, nervous, or anxious, or unable to sleep at night because your mind is troubled all the time - these days? Not at all       Coping/Stress    Major Change/Loss/Stressor illness    Patient Personal Strengths able to adapt;resilient    Techniques to [place] with Loss/Stress/Change diversional activities    Reaction to Health Status accepting    Understanding of Condition and Treatment adequate understanding of medical condition;adequate understanding of treatment       Developmental Stage (Eriksson's)    Developmental Stage Stage 8 (65 years-death/Late Adulthood) Integrity vs. Despair       C-SSRS (Recent)    Q1 Wished to be Dead (Past Month) no    Q2 Suicidal Thoughts (Past Month) no    Q6 Suicide Behavior (Lifetime) no       Violence Risk    Feels Like Hurting Others no    Previous Attempt to Harm Others no                   Abuse/Neglect    No documentation.                  Legal    No documentation.                  Substance Abuse    No documentation.                  Patient Forms    No documentation.                     Jeet Guadalupe RN

## 2024-09-03 NOTE — PLAN OF CARE
Goal Outcome Evaluation:  Plan of Care Reviewed With: patient        Progress: no change  Outcome Evaluation: Pt seen for OT evaluation today.  Pt presented to the hospital with c/o LUE, LLE and facial numbness and dizziness.  Pt received supine in bed today and reports all symptoms have resolved except for slight numbness in his mouth.  Pt demonstrates good ROM and strength BUEs.  He was able to sit eob independently, stood independently and walked 136' with sba.  Orthostatic BP were completed as follows: supine 119/75, [id number]/87, [id number]/86.  Pt is independent with self care and functional mobility tasks.  He appears to be back to his baseline functional level and has no skilled OT needs at this time.  OT will sign off.      Anticipated Discharge Disposition (OT): home

## 2024-09-03 NOTE — PLAN OF CARE
Goal Outcome Evaluation:  Plan of Care Reviewed With: patient         VSS. Patient denies any new complaints. Reports resolution of left facial numbness.

## 2024-09-03 NOTE — PLAN OF CARE
Goal Outcome Evaluation:  Plan of Care Reviewed With: patient        Progress: no change  Outcome Evaluation: Pt participated in PT initial evaluation this date. Pt presents supine in bed, pleasant and agreeable to PT evaluation. Pt AOx4, denies reports of pain at rest. At baseline, pt lives at home with his wife in a H with 6 NESTOR. Pt reports he is normally (I) with all daily activities including community ambulation without AD. Pt denies reports of falls at home. Pt performed supine to sit on EOB with mod (I). Pt performed STS and ambulation x136' (I) with no AD. Pt demonstrates decreased nuvia during ambulation, no LOB noted. Following evaluation, pt left seated in bedside chair with call light and all needs within reach. Pt appears to be at functional baseline, no skilled PT interventions indicated at this time.   Once medically stable, recommend pt to d/c home with support from wife and use a SPC for ambulation. PT will sign off.      Anticipated Discharge Disposition (PT): home

## 2024-09-16 NOTE — PROGRESS NOTES
New Patient Office Visit      Encounter Date: 2024   Patient Name: Lizandro Culp  : 1945   MRN: [id number]   PCP: Dorcas Figueroa MD    Chief Complaint:    Chief Complaint   Patient presents with    Hospital Follow Up Visit     Still having numbness and dizziness       History of Present Illness: Lizandro Culp is a 79 y.o. male who is here today to establish care.  Medical history includes CAD s/p stents, SSS, PPM.  Patient developed left-sided numbness and tingling as well as lightheadedness following a prostate MRI on 2024.  He underwent a CT head which was negative for acute abnormality.  CT perfusion negative.  CTA head and neck negative for significant flow-limiting stenosis, no LVO.  TTE showed EF of 56 to 60%, normal left atrium, negative bubble study.  Repeat CT head on 9/3 was again negative for acute abnormality.  Patient unable to have an MRI secondary to pacemaker.  Patient did have some persistent numbness in his left cheek, and was diagnosed with a TIA.  He was discharged home on aspirin 325 mg and atorvastatin 40 mg (he has previously been intolerant to Plavix secondary to hypotension).    Clinic visit 2024: Patient presents today for follow-up accompanied by his wife.  He tells me that he has had ongoing paresthesias on the left side of his face, isolated to his upper lip and gums that has not changed since going to the hospital.  He denies any numbness or tingling to his extremities.  No unilateral weakness, no speech disturbance, no visual changes.  He has had 4 episodes of dizziness that seem to primarily occur while he is out.  He describes severe dizziness,improves with rest.  Unsure what his blood pressure is during these episodes.  But he and his wife report typically his blood pressures between 90 and 100 systolic and this has been an ongoing issue since he lived in [place].  We reviewed ways to try to increase his blood pressure at home he was unable to  have a MRI at [facility] secondary to his pacemaker but is interested in having this completed outpatient.  He has remained compliant with aspirin 325 mg.  He was unable to take the Lipitor as he was intolerant to this medication several years ago due to muscle cramping in his legs.  His LDL is quite elevated and we discussed other alternatives including lifestyle modifications and ezetimibe which he is willing to try today.    Stroke Risk Factors: hyperlipidemia      Subjective      Review of Systems:   Review of Systems   Constitutional:  Negative for fever.   HENT:  Negative for trouble swallowing.    Eyes:  Negative for visual disturbance.   Respiratory:  Negative for cough and shortness of breath.    Cardiovascular:  Negative for chest pain and palpitations.   Gastrointestinal:  Negative for nausea and vomiting.   Musculoskeletal:  Negative for gait problem.   Neurological:  Positive for dizziness and numbness. Negative for weakness, headache and confusion.   Psychiatric/Behavioral: Negative.         Past Medical History:   Past Medical History:   Diagnosis Date    Bradycardia     s/p pacemaker    CAD (coronary artery disease)     Hearing difficulty     Heart attack 2017    Hiatal hernia     Hyperthyroidism     Orthostatic hypotension     Prostate cancer     Refusal of statin medication by patient     Refused pneumococcal vaccination     Sick sinus syndrome     Skin cancer     basal cell cancer on right ear    Snores     Trigger finger     Left thumb       Past Surgical History:   Past Surgical History:   Procedure Laterality Date    BACK SURGERY      fusion/deiscectomy    CARDIAC CATHETERIZATION      06/02/2023 PER DR. LAWRENCE    CARDIAC CATHETERIZATION N/A 06/02/2023    Procedure: Left Heart Cath;  Surgeon: Nain Lawrence MD;  Location: [facility] CATH INVASIVE LOCATION;  Service: Cardiovascular;  Laterality: N/A;  Please schedule with Interventionalist.    COLONOSCOPY      2015    CORONARY ANGIOPLASTY   2003    x6 stents    CORONARY ANGIOPLASTY WITH STENT PLACEMENT  2017    x 3 stent    CYBERKNIFE  10/20/2023    prostate/SV    INGUINAL HERNIA REPAIR Bilateral     INSERT / REPLACE / REMOVE PACEMAKER      PROSTATE FIDUCIAL MARKER PLACEMENT  2023       Family History:   Family History   Problem Relation Age of Onset    Stroke Mother     Parkinsonism Father     Prostate cancer Brother     Stroke Other     Colon cancer Neg Hx        Social History:   Social History     Socioeconomic History    Marital status:    Tobacco Use    Smoking status: Former     Current packs/day: 0.00     Types: Cigarettes     Start date:      Quit date:      Years since quittin.7     Passive exposure: Past    Smokeless tobacco: Never   Vaping Use    Vaping status: Never Used   Substance and Sexual Activity    Alcohol use: Yes     Alcohol/week: 1.0 standard drink of alcohol     Types: 1 Glasses of wine per week     Comment: one drink per day    Drug use: Never    Sexual activity: Defer       Medications:     Current Outpatient Medications:     Ascorbic Acid (VITAMIN C PO), Take 1,000 mg by mouth Daily., Disp: , Rfl:     aspirin 325 MG EC tablet, Take 1 tablet by mouth Daily., Disp: , Rfl:     chlorhexidine (PERIDEX) 0.12 % solution, SWISH AND SPIT IN MOUTH AT NIGHT AS DIRECTED, Disp: , Rfl:     DIGESTIVE ENZYMES PO, Take 1 tablet by mouth 2 (Two) Times a Day., Disp: , Rfl:     KRILL OIL PO, Take 2 tablets by mouth Daily., Disp: , Rfl:     Misc Natural Products (PROSTATE SUPPORT PO), Take 1 tablet by mouth 2 (Two) Times a Day. Super Beta Prostate, Disp: , Rfl:     Probiotic Product (Probiotic Daily) capsule, Take 1 tablet by mouth Daily., Disp: , Rfl:     Saw Palmetto, Serenoa repens, (SAW PALMETTO PO), Take 1 tablet by mouth Daily., Disp: , Rfl:     tamsulosin (FLOMAX) 0.4 MG capsule 24 hr capsule, Take 1 capsule by mouth Every Night. (Patient taking differently: Take 1 capsule by mouth Daily.), Disp: 30 capsule,  "Rfl: 11    ezetimibe (Zetia) 10 MG tablet, Take 1 tablet by mouth Daily., Disp: 90 tablet, Rfl: 3    nitroglycerin (NITROSTAT) 0.4 MG SL tablet, DISSOLVE ONE TABLET UNDER THE TONGUE EVERY 5 MINUTES AS NEEDED FOR CHEST PAIN.  DO NOT EXCEED A TOTAL OF 3 DOSES IN 15 MINUTES, Disp: 25 tablet, Rfl: 0    Allergies:   Allergies   Allergen Reactions    Lipitor [Atorvastatin] Provider Review Needed     Flu like symptoms    Oatmeal Other (See Comments)     Foggy feeling, swollen glands, myalgia       Objective     Physical Exam:  Vital Signs:   Vitals:    09/17/24 0848   BP: 124/82   BP Location: Right arm   Patient Position: Sitting   Cuff Size: Adult   Pulse: 60   Resp: 18   Temp: 97.5 °F (36.4 °C)   TempSrc: Infrared   SpO2: 96%   Weight: 90.7 kg (200 lb)   Height: 180 cm (70.87\")     Body mass index is 28 kg/m².     Physical Exam  Vitals and nursing note reviewed.   Constitutional:       Appearance: Normal appearance.   HENT:      Head: Normocephalic and atraumatic.   Eyes:      General: Lids are normal.      Extraocular Movements: Extraocular movements intact.      Pupils: Pupils are equal, round, and reactive to light.   Cardiovascular:      Rate and Rhythm: Normal rate.   Pulmonary:      Effort: Pulmonary effort is normal. No respiratory distress.   Musculoskeletal:         General: No swelling. Normal range of motion.      Cervical back: Normal range of motion.   Skin:     General: Skin is warm and dry.   Neurological:      Mental Status: He is alert and oriented to person, place, and time.      Cranial Nerves: Cranial nerve deficit present.      Sensory: No sensory deficit.      Motor: Motor strength is normal.No weakness.      Coordination: Coordination is intact.   Psychiatric:         Mood and Affect: Mood normal.         Speech: Speech normal.         Behavior: Behavior normal.       Neurological Exam  Mental Status  Alert. Oriented to person, place, and time. Speech is normal. Language is fluent with no aphasia. " Attention and concentration are normal.    Cranial Nerves  CN II: Visual fields full to confrontation.  CN III, IV, VI: Extraocular movements intact bilaterally. Normal lids and orbits bilaterally. Pupils equal round and reactive to light bilaterally.  CN V:  Right: Facial sensation is normal.  Left: Diminished sensation of the entire left side of the face.  CN VII: Full and symmetric facial movement.  CN XI: Shoulder shrug strength is normal.  CN XII: Tongue midline without atrophy or fasciculations.    Motor  Normal muscle bulk throughout. No fasciculations present. Normal muscle tone. No abnormal involuntary movements. Strength is 5/5 throughout all four extremities.    Sensory  Light touch is normal in upper and lower extremities.     Reflexes  Right Plantar: downgoing  Left Plantar: downgoing    Coordination    Finger-to-nose, rapid alternating movements and heel-to-shin normal bilaterally without dysmetria.    Gait    Not tested.         NIH Stroke Scale  Time: 17:34 EDT  Person Administering Scale: ROMAIN James    1a  Level of consciousness: 0=alert; keenly responsive   1b. LOC questions:  0=Answers both questions correctly   1c. LOC commands: 0=Performs both tasks correctly   2.  Best Gaze: 0=normal   3.  Visual: 0=No visual loss   4. Facial Palsy: 0=Normal symmetric movement   5a.  Motor left arm: 0=No drift, limb holds 90 (or 45) degrees for full 10 seconds   5b.  Motor right arm: 0=No drift, limb holds 90 (or 45) degrees for full 10 seconds   6a. motor left le=No drift, limb holds 90 (or 45) degrees for full 10 seconds   6b  Motor right le=No drift, limb holds 90 (or 45) degrees for full 10 seconds   7. Limb Ataxia: 0=Absent   8.  Sensory: 1=Mild to moderate sensory loss; patient feels pinprick is less sharp or is dull on the affected side; there is a loss of superficial pain with pinprick but patient is aware He is being touched   9. Best Language:  0=No aphasia, normal   10.  Dysarthria: 0=Normal   11. Extinction and Inattention: 0=No abnormality    Total:   1         Modified [place] Score:       MODIFIED CANDIDO SCALE (to be assessed for each patient having history of stroke) []Stroke history but not assessed  [x]0: No symptoms at all  []1: No significant disability despite symptoms  []2: Slight disability  []3: Moderate disability  []4: Moderately severe disability  []5: Severe disability  []6: Death     PHQ-2 Depression Screening  Little interest or pleasure in doing things? 0-->not at all   Feeling down, depressed, or hopeless? 0-->not at all   PHQ-2 Total Score 0         Results Reviewed:     Labs  H&H 14.2/41.8  Platelets 162    A1c 5.6  Vitamin B12 476  Folate 5.49    Imaging    CT head 9/2/2024: negative for acute abnormality.      CT perfusion negative.      CTA head and neck negative for significant flow-limiting stenosis, no LVO.      TTE showed EF of 56 to 60%, normal left atrium, negative bubble study.      Repeat CT head on 9/3 was again negative for acute abnormality.    Assessment / Plan      Assessment/Plan:   Diagnoses and all orders for this visit:    Personal History of TIA  -Residual symptoms; left facial numbness.  Will order MRI brain wo to be performed outpatient to evaluate for ischemia.  He will need to have his device checked prior.  -Continue aspirin 325 mg  -Monitor BP at home, goal <130/80 as untreated hypertension is a risk factor for future stroke  -No therapy needs  -Reviewed reduction strategies for modifiable stroke risk factors    2.  HLD, goal < 70  -; discussed lifestyle modifications.    -Zetia 10 mg daily    Discussed the importance of medication compliance Aspirin 325mg daily and lifestyle modifications adequate control of blood pressure, adequate control of cholesterol (goal LDL <70), increased physical activity, and implementation of healthy diet to help reduce the risk of future cerebrovascular events.  Also discussed the signs  symptoms that would warrant the patient return back to the emergency department including unilateral weakness, unilateral numbness, visual disturbances, loss of balance, speech difficulties, and/or a sudden severe headache.  Patient verbalized understanding.      Follow Up:   Return in about 3 months (around 12/17/2024).    ROMAIN James  [facility]

## 2024-09-17 ENCOUNTER — OFFICE VISIT (OUTPATIENT)
Dept: NEUROLOGY | Facility: CLINIC | Age: 79
End: 2024-09-17
Payer: MEDICARE

## 2024-09-17 VITALS
WEIGHT: 200 LBS | TEMPERATURE: 97.5 F | DIASTOLIC BLOOD PRESSURE: 82 MMHG | HEIGHT: 71 IN | OXYGEN SATURATION: 96 % | SYSTOLIC BLOOD PRESSURE: 124 MMHG | RESPIRATION RATE: 18 BRPM | HEART RATE: 60 BPM | BODY MASS INDEX: 28 KG/M2

## 2024-09-17 DIAGNOSIS — Z86.73 PERSONAL HISTORY OF TIA (TRANSIENT ISCHEMIC ATTACK): ICD-10-CM

## 2024-09-17 DIAGNOSIS — R20.0 LEFT FACIAL NUMBNESS: Primary | ICD-10-CM

## 2024-09-17 PROCEDURE — 99214 OFFICE O/P EST MOD 30 MIN: CPT | Performed by: NURSE PRACTITIONER

## 2024-09-17 RX ORDER — EZETIMIBE 10 MG/1
10 TABLET ORAL DAILY
Qty: 90 TABLET | Refills: 3 | Status: SHIPPED | OUTPATIENT
Start: 2024-09-17 | End: 2025-09-17

## 2024-09-30 PROBLEM — Z86.73 PERSONAL HISTORY OF TIA (TRANSIENT ISCHEMIC ATTACK): Status: ACTIVE | Noted: 2024-09-30

## 2024-10-02 ENCOUNTER — HOSPITAL ENCOUNTER (OUTPATIENT)
Dept: MRI IMAGING | Facility: HOSPITAL | Age: 79
Discharge: HOME | End: 2024-10-02
Admitting: NURSE PRACTITIONER
Payer: MEDICARE

## 2024-10-02 VITALS — HEART RATE: 50 BPM | OXYGEN SATURATION: 97 % | DIASTOLIC BLOOD PRESSURE: 64 MMHG | SYSTOLIC BLOOD PRESSURE: 109 MMHG

## 2024-10-02 DIAGNOSIS — R20.0 LEFT FACIAL NUMBNESS: ICD-10-CM

## 2024-10-02 PROCEDURE — 70551 MRI BRAIN STEM W/O DYE: CPT

## 2024-10-02 NOTE — PROGRESS NOTES
Please let the patient know that I tried to call him today (Wednesday) to review the results of his MRI.  There is no evidence of any new or old stroke that would explain his ongoing numbness in his left face or his episodes of dizziness.  I suspect his episodes are primarily blood pressure related.  He should be keeping a log of his blood pressures especially when his episodes occur.

## 2024-10-03 ENCOUNTER — TELEPHONE (OUTPATIENT)
Dept: NEUROLOGY | Facility: CLINIC | Age: 79
End: 2024-10-03
Payer: MEDICARE

## 2024-10-03 NOTE — TELEPHONE ENCOUNTER
Patients wife returned our call and I relayed the message from our provider.  Patients wife verbalized understanding.

## 2024-10-03 NOTE — TELEPHONE ENCOUNTER
----- Message from Tara Hartman sent at 10/2/2024  5:21 PM EDT -----  Please let the patient know that I tried to call him today (Wednesday) to review the results of his MRI.  There is no evidence of any new or old stroke that would explain his ongoing numbness in his left face or his episodes of dizziness.  I suspect his episodes are primarily blood pressure related.  He should be keeping a log of his blood pressures especially when his episodes occur.

## 2024-10-23 ENCOUNTER — OFFICE VISIT (OUTPATIENT)
Dept: CARDIOLOGY | Facility: CLINIC | Age: 79
End: 2024-10-23
Payer: MEDICARE

## 2024-10-23 VITALS
WEIGHT: 203 LBS | HEART RATE: 67 BPM | OXYGEN SATURATION: 94 % | DIASTOLIC BLOOD PRESSURE: 64 MMHG | BODY MASS INDEX: 27.5 KG/M2 | SYSTOLIC BLOOD PRESSURE: 98 MMHG | HEIGHT: 72 IN

## 2024-10-23 DIAGNOSIS — I49.5 SSS (SICK SINUS SYNDROME): ICD-10-CM

## 2024-10-23 DIAGNOSIS — I25.810 CORONARY ARTERY DISEASE INVOLVING CORONARY BYPASS GRAFT OF NATIVE HEART WITHOUT ANGINA PECTORIS: Primary | ICD-10-CM

## 2024-10-23 NOTE — PROGRESS NOTES
[facility] Cardiology  Office visit  Lizandro Culp  1945  [phone]  There is no work phone number on file.    VISIT DATE:  10/23/2024    PCP: Dorcas Figueroa MD  [address] [facility] [id number]    CC:  Chief Complaint   Patient presents with    Coronary artery disease involving coronary bypass graft of        Previous cardiac studies and procedures:  October 3 2002: Cardiac catheterization: RCA PCI/stenting  October 4, 2002 LAD stent.  October 15 2002 left circumflex stent     March 2017 LAD stent, left circumflex stent, RCA stent.     April 2017: Stent RCA.     August 2019   Saint Thong dual-chamber pacemaker  Myocardial perfusion imaging: EF 55%, mild inferolateral ischemia.     July 2020 TTE: EF 60%, mild diastolic dysfunction, mitral valve prolapse, posterior leaflet, mild MR.    June 2023 LHC    95% mid LAD Denovo stenosis treated 3.5 x 18 Xience EES    70% proximal LAD ISR treated 4.0 x 15 Xience EES.    OCT showed MLA greater than 8 mm² throughout both stents.    Diffuse 50% ISR mid left circumflex    Widely patent RCA stent    Normal LVEF    September 2024 TTE    Left ventricular systolic function is normal. Calculated left ventricular EF = 59.3% Left ventricular ejection fraction appears to be 56 - 60%. Left ventricular diastolic function was normal.    Normal right ventricular size and function.    Mild mitral valve regurgitation is present.    Significant calcification and sclerosis of the aortic valve without stenosis.    Saline test results are negative for right to left atrial level shunt.     ASSESSMENT:   Diagnosis Plan   1. Coronary artery disease involving coronary bypass graft of native heart without angina pectoris        2. SSS (sick sinus syndrome)              Device interrogation:  Saint Thong dual-chamber pacemaker  Normal interrogation, see scanned sheet.  No significant arrhythmia.    PLAN:  Coronary artery disease: Currently stable and  "asymptomatic.  Continue aspirin.  Statin intolerant.  Previously has experienced hypotension on clopidogrel, if he has recurrent episodes will switch either to Effient or Brilinta     Sick sinus syndrome: Continue routine follow-up in device clinic.     Hyperlipidemia: Goal LDL less than 100.  Statin intolerant and refusing alternative agents.  Continue Zetia.    Hypotension: Discussed abortive maneuvers.  Avoid dehydration.  Will consider addition of as needed midodrine if symptoms worsen.    Subjective  Interval assessment:   Blood pressures running less than 130/80 mmHg.  Denies chest pain or dyspnea.  Compliant with medical therapy.  Intermittent orthostasis.  Recently evaluated for potential TIA.  Evaluation unremarkable.  Zetia initiated.    Initial evaluation: 76-year-old gentleman with a history of coronary artery disease requiring recurrent multivessel PCI.  Has been stable recently.  Has stable shortness of breath in a class II pattern.  Denies chest discomfort.  Blood pressures running less than 130/80 mmHg.  Has not required any sublingual nitroglycerin recently.  Taking aspirin on a regular basis.  Developed flulike symptoms on multiple statins agents.  Discussed potential options such as PCSK9 inhibitors, he is not interested in trying an alternative agent at this time.  Reviewed most recent laboratory evaluation.    PHYSICAL EXAMINATION:  Vitals:    10/23/24 1024   BP: 98/64   BP Location: Left arm   Patient Position: Sitting   Pulse: 67   SpO2: 94%   Weight: 92.1 kg (203 lb)   Height: 181.6 cm (71.5\")     General Appearance:    Alert, cooperative, no distress, appears stated age   Head:    Normocephalic, without obvious abnormality, atraumatic   Eyes:    conjunctiva/corneas clear   Nose:   Nares normal, septum midline, mucosa normal, no drainage   Throat:   Lips, teeth and gums normal   Neck:   Supple, symmetrical, trachea midline, no carotid    bruit or JVD   Lungs:     Clear to auscultation " bilaterally, respirations unlabored   Chest Wall:    No tenderness or deformity    Heart:    Regular rate and rhythm, S1 and S2 normal, no murmur, rub   or gallop, normal carotid impulse bilaterally without bruit.   Abdomen:     Soft, non-tender   Extremities:   Extremities normal, atraumatic, no cyanosis or edema   Pulses:   2+ and symmetric all extremities   Skin:   Skin color, texture, turgor normal, no rashes or lesions       Diagnostic Data:  Procedures  Lab Results   Component Value Date    TRIG 67 09/03/2024    HDL 55 09/03/2024     Lab Results   Component Value Date    GLUCOSE 103 (H) 09/03/2024    BUN 13 09/03/2024    CREATININE 0.82 09/03/2024     09/03/2024    K 4.1 09/03/2024     09/03/2024    CO2 21.4 (L) 09/03/2024     Lab Results   Component Value Date    HGBA1C 5.60 09/03/2024     Lab Results   Component Value Date    WBC 4.77 09/03/2024    HGB 14.2 09/03/2024    HCT 41.8 09/03/2024     09/03/2024       Allergies  Allergies   Allergen Reactions    Lipitor [Atorvastatin] Provider Review Needed     Flu like symptoms    Oatmeal Other (See Comments)     Foggy feeling, swollen glands, myalgia       Current Medications    Current Outpatient Medications:     Ascorbic Acid (VITAMIN C PO), Take 1,000 mg by mouth Daily., Disp: , Rfl:     aspirin 325 MG EC tablet, Take 1 tablet by mouth Daily., Disp: , Rfl:     chlorhexidine (PERIDEX) 0.12 % solution, SWISH AND SPIT IN MOUTH AT NIGHT AS DIRECTED, Disp: , Rfl:     DIGESTIVE ENZYMES PO, Take 1 tablet by mouth 2 (Two) Times a Day., Disp: , Rfl:     ezetimibe (Zetia) 10 MG tablet, Take 1 tablet by mouth Daily., Disp: 90 tablet, Rfl: 3    KRILL OIL PO, Take 2 tablets by mouth Daily., Disp: , Rfl:     Misc Natural Products (PROSTATE SUPPORT PO), Take 1 tablet by mouth 2 (Two) Times a Day. Super Beta Prostate, Disp: , Rfl:     nitroglycerin (NITROSTAT) 0.4 MG SL tablet, DISSOLVE ONE TABLET UNDER THE TONGUE EVERY 5 MINUTES AS NEEDED FOR CHEST PAIN.   DO NOT EXCEED A TOTAL OF 3 DOSES IN 15 MINUTES, Disp: 25 tablet, Rfl: 0    Probiotic Product (Probiotic Daily) capsule, Take 1 tablet by mouth Daily., Disp: , Rfl:     Saw Palmetto, Serenoa repens, (SAW PALMETTO PO), Take 1 tablet by mouth Daily., Disp: , Rfl:     tamsulosin (FLOMAX) 0.4 MG capsule 24 hr capsule, Take 1 capsule by mouth Every Night. (Patient taking differently: Take 1 capsule by mouth Daily.), Disp: 30 capsule, Rfl: 11          ROS  ROS      SOCIAL HX  Social History     Socioeconomic History    Marital status:    Tobacco Use    Smoking status: Former     Current packs/day: 0.00     Types: Cigarettes     Start date:      Quit date:      Years since quittin.8     Passive exposure: Past    Smokeless tobacco: Never   Vaping Use    Vaping status: Never Used   Substance and Sexual Activity    Alcohol use: Yes     Alcohol/week: 1.0 standard drink of alcohol     Types: 1 Glasses of wine per week     Comment: one drink per day    Drug use: Never    Sexual activity: Defer       FAMILY HX  Family History   Problem Relation Age of Onset    Stroke Mother     Parkinsonism Father     Prostate cancer Brother     Stroke Other     Colon cancer Neg Hx              Ren Sneed III, MD, FACC

## 2024-11-01 RX ORDER — NITROGLYCERIN 0.4 MG/1
0.4 TABLET SUBLINGUAL
Qty: 25 TABLET | Refills: 1 | Status: SHIPPED | OUTPATIENT
Start: 2024-11-01

## 2024-12-17 ENCOUNTER — OFFICE VISIT (OUTPATIENT)
Dept: NEUROLOGY | Facility: CLINIC | Age: 79
End: 2024-12-17
Payer: MEDICARE

## 2024-12-17 VITALS
BODY MASS INDEX: 28.42 KG/M2 | SYSTOLIC BLOOD PRESSURE: 114 MMHG | WEIGHT: 203 LBS | HEIGHT: 71 IN | OXYGEN SATURATION: 96 % | DIASTOLIC BLOOD PRESSURE: 66 MMHG | HEART RATE: 64 BPM | TEMPERATURE: 98.4 F

## 2024-12-17 DIAGNOSIS — Z86.73 PERSONAL HISTORY OF TIA (TRANSIENT ISCHEMIC ATTACK): Primary | ICD-10-CM

## 2024-12-17 PROCEDURE — 99214 OFFICE O/P EST MOD 30 MIN: CPT | Performed by: NURSE PRACTITIONER

## 2024-12-17 RX ORDER — MIDODRINE HYDROCHLORIDE 2.5 MG/1
2.5 TABLET ORAL 2 TIMES DAILY
Qty: 60 TABLET | Refills: 1 | Status: SHIPPED | OUTPATIENT
Start: 2024-12-17

## 2024-12-17 RX ORDER — MIDODRINE HYDROCHLORIDE 5 MG/1
5 TABLET ORAL AS NEEDED
COMMUNITY
End: 2024-12-17

## 2024-12-17 NOTE — PROGRESS NOTES
Stroke Clinic Office Visit     Encounter Date: 2024   Patient Name: Lizandro Culp  : 1945  MRN: [id number]   PCP: Dorcas Figueroa MD  Referring Provider: No ref. provider found     Chief Complaint Follow-up    History of Present Illness  Lizandro Culp is a 79 y.o. with  medical history of TIA 2024, MI (2017), CAD s/p stents, SSS, PPM.  The patient arrives today with no complaints.  He reports, no longer having difficulty with tingling or lightheadedness since starting Midodrine.    The patient reports, he continues to take 325 mg aspirin and Zetia for secondary stroke prevention.  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  We discussed the importance of maintaining adequate hydration as well, and he states, needing to be better about drinking more water during the day.    He denies any difficulty with ambulance or recent falls.  Patient denies any other neurological symptoms, including: headache, vision changes, dysesthesias, loss of consciousness, seizure or new areas of motor weakness.     Subjective     Past Medical History:   Diagnosis Date    Bradycardia     s/p pacemaker    CAD (coronary artery disease)     Hearing difficulty     Heart attack 2017    Hiatal hernia     Hyperthyroidism     Orthostatic hypotension     Prostate cancer     Refusal of statin medication by patient     Refused pneumococcal vaccination     Sick sinus syndrome     Skin cancer     basal cell cancer on right ear    Snores     Trigger finger     Left thumb      Past Surgical History:   Procedure Laterality Date    BACK SURGERY      fusion/deiscectomy    CARDIAC CATHETERIZATION      2023 PER DR. EDGAR    CARDIAC CATHETERIZATION N/A 2023    Procedure: Left Heart Cath;  Surgeon: Nain Edgar MD;  Location: [facility] CATH INVASIVE [facility];  Service: Cardiovascular;  Laterality: N/A;  Please schedule with Interventionalist.    COLONOSCOPY          CORONARY  ANGIOPLASTY  2003    x6 stents    CORONARY ANGIOPLASTY WITH STENT PLACEMENT  2017    x 3 stent    CYBERKNIFE  10/20/2023    prostate/SV    INGUINAL HERNIA REPAIR Bilateral     INSERT / REPLACE / REMOVE PACEMAKER      PROSTATE FIDUCIAL MARKER PLACEMENT  2023     Family History   Problem Relation Age of Onset    Stroke Mother     Parkinsonism Father     Prostate cancer Brother     Stroke Other     Colon cancer Neg Hx       Social History     Socioeconomic History    Marital status:    Tobacco Use    Smoking status: Former     Current packs/day: 0.00     Types: Cigarettes     Start date:      Quit date:      Years since quittin.9     Passive exposure: Past    Smokeless tobacco: Never   Vaping Use    Vaping status: Never Used   Substance and Sexual Activity    Alcohol use: Yes     Alcohol/week: 1.0 standard drink of alcohol     Types: 1 Glasses of wine per week     Comment: one drink per day    Drug use: Never    Sexual activity: Defer       Current Outpatient Medications:     Ascorbic Acid (VITAMIN C PO), Take 1,000 mg by mouth Daily., Disp: , Rfl:     aspirin 325 MG EC tablet, Take 1 tablet by mouth Daily., Disp: , Rfl:     DIGESTIVE ENZYMES PO, Take 1 tablet by mouth 2 (Two) Times a Day., Disp: , Rfl:     ezetimibe (Zetia) 10 MG tablet, Take 1 tablet by mouth Daily., Disp: 90 tablet, Rfl: 3    KRILL OIL PO, Take 2 tablets by mouth Daily., Disp: , Rfl:     midodrine (PROAMATINE) 5 MG tablet, Take 1 tablet by mouth As Needed. Patient takes PRN for low BP. Prescribed by Doctor in [place] 2021, Disp: , Rfl:     Misc Natural Products (PROSTATE SUPPORT PO), Take 1 tablet by mouth 2 (Two) Times a Day. Super Beta Prostate, Disp: , Rfl:     nitroglycerin (NITROSTAT) 0.4 MG SL tablet, DISSOLVE ONE TABLET UNDER THE TONGUE EVERY 5 MINUTES AS NEEDED FOR CHEST PAIN.  DO NOT EXCEED A TOTAL OF 3 DOSES IN 15 MINUTES, Disp: 25 tablet, Rfl: 1    Probiotic Product (Probiotic Daily) capsule, Take 1  "tablet by mouth Daily., Disp: , Rfl:     Saw Palmetto, Serenoa repens, (SAW PALMETTO PO), Take 1 tablet by mouth Daily., Disp: , Rfl:     tamsulosin (FLOMAX) 0.4 MG capsule 24 hr capsule, Take 1 capsule by mouth Every Night. (Patient taking differently: Take 1 capsule by mouth Daily.), Disp: 30 capsule, Rfl: 11    chlorhexidine (PERIDEX) 0.12 % solution, SWISH AND SPIT IN MOUTH AT NIGHT AS DIRECTED (Patient not taking: Reported on 12/17/2024), Disp: , Rfl:    Allergies   Allergen Reactions    Lipitor [Atorvastatin] Provider Review Needed     Flu like symptoms    Oatmeal Other (See Comments)     Foggy feeling, swollen glands, myalgia        Objective     Physical Exam:  Vitals:    12/17/24 1135   BP: 114/66   Pulse: 64   Temp: 98.4 °F (36.9 °C)   SpO2: 96%   Weight: 92.1 kg (203 lb)   Height: 181.6 cm (71.5\")      Body mass index is 27.92 kg/m².     Physical Exam:  General Appearance: Alert  Eyes: Anicteric sclera  HEENT: no scleral injection   Lungs: respirations appear comfortable, no obvious increased work of breathing  Extremities: No cyanosis or fingernail clubbing   Skin: No rashes in exposed skin areas     Neurological Examination:   Mental status: Alert and oriented to person, place, and time. Speech with no dysarthria, able to name and repeat with no difficulty.    Cranial Nerves: Visual fields intact. Extraocular movements are intact with no nystagmus. Facial sensation intact. Face symmetrical. Hearing grossly intact. Palate movement is symmetric. Full shoulder shrug bilaterally. Tongue protrudes midline.   Sensory: Sensory exam to light touch in all four extremities distally is normal. Double simultaneous sensory stimulation shows no extinction  Motor: Normal tone throughout. Normal bulk. Pronator drift is absent.  Left upper extremity: 5/5 deltoid, tricep, bicep, interosseous, hand .   Right upper extremity: 5/5 deltoid, tricep, bicep, interosseous, hand .   Left lower extremity: 5/5 iliopsoas, " knee extension/flexion, foot dorsi/plantarflexion.  Right lower extremity: 5/5 iliopsoas, knee extension/flexion, foot dorsi/plantarflexion.  Cerebellar: Finger-to-nose intact. Heel-to-shin intact. Rapid alternating movements are intact.   Gait: Normal.         PHQ-9 Depression Screening  Little interest or pleasure in doing things? Not at all   Feeling down, depressed, or hopeless? Not at all   PHQ-2 Total Score 0   Trouble falling or staying asleep, or sleeping too much?     Feeling tired or having little energy?     Poor appetite or overeating?     Feeling bad about yourself - or that you are a failure or have let yourself or your family down?     Trouble concentrating on things, such as reading the newspaper or watching television?     Moving or speaking so slowly that other people could have noticed? Or the opposite - being so fidgety or restless that you have been moving around a lot more than usual?     Thoughts that you would be better off dead, or of hurting yourself in some way?     PHQ-9 Total Score     If you checked off any problems, how difficult have these problems made it for you to do your work, take care of things at home, or get along with other people?          JEANNETTE Fall Risk Clinician Key Questions   Have you fallen in the past year?: No  Do you feel unsteady with walking?: No  Are you worried about falling?: No  Stay Idependant Patient Questions   Patient Fall Risk Assessment Score : 0  Fall Risk Category  Fall Risk Category: Moderate      Laboratory Results:   Hemoglobin   Date Value Ref Range Status   09/03/2024 14.2 13.0 - 17.7 g/dL Final     Hematocrit   Date Value Ref Range Status   09/03/2024 41.8 37.5 - 51.0 % Final     Platelets   Date Value Ref Range Status   09/03/2024 162 140 - 450 10*3/mm3 Final     Hemoglobin A1C   Date Value Ref Range Status   09/03/2024 5.60 4.80 - 5.60 % Final     LDL Cholesterol    Date Value Ref Range Status   09/03/2024 135 (H) 0 - 100 mg/dL Final     AST  "(SGOT)   Date Value Ref Range Status   09/02/2024 16 1 - 40 U/L Final     ALT (SGPT)   Date Value Ref Range Status   09/02/2024 15 1 - 41 U/L Final           Assessment / Plan      Assessment and Plan    History of TIA   -Continue 81 mg of aspirin   -Continue 80 mg of Atorvastatin  -Chair Yoga exercise 30 minutes 3 - 4 times a day  -Heart and Brain Healthy diet, Consider a Mediterranean Diet.   -Journal BP at home and call PCP with persistent BP >130/80  -The patient was advised to follow up with his primary care physician for ongoing management and screening for hypertension, hypercholesterolemia, and diabetes.   -The patient was counseled on long-term blood pressure goal less than 120/80, long-term LDL goal less than 70, and long-term hemoglobin A1c goal less than 7.0% for stroke prevention. This was also printed for the patient in the after visit summary.  -The patient was counseled he experiences symptoms of acute onset unilateral arm, leg, or face weakness/numbness/tingling, unilateral facial droop, slurred speech/word finding difficulty, visual disturbance (\"curtain falling\" or visual field loss), or severe headache he should call 911 and present to the nearest emergency department immediately.    2. Hypotention  -continue Midodrine   -Dink at least 8 8oz glasses of water daily    I spent 30 minutes caring for Edward on this date of service. This time includes time spent by me in the following activities:reviewing tests, performing a medically appropriate examination and/or evaluation , counseling and educating the patient/family/caregiver, ordering medications, tests, or procedures, and documenting information in the medical record    Follow Up  No follow-ups on file.    Patient or patient representative verbalized consent for the use of Ambient Listening during the visit with  ROMAIN Francois for chart documentation.     12/17/2024  11:59 EST        ROMAIN Francois  [facility] NEURO STROKE     Part of this " note may be an electronic transcription/translation of spoken language to printed text using the Dragon Dictation System.

## 2025-01-20 ENCOUNTER — TELEPHONE (OUTPATIENT)
Dept: NEUROLOGY | Facility: CLINIC | Age: 80
End: 2025-01-20
Payer: MEDICARE

## 2025-01-20 NOTE — TELEPHONE ENCOUNTER
Caller: SILVANO WEEKS    Relationship: Emergency Contact; SPOUSE    Best call back number: [phone]    What was the call regarding: PT'S WIFE CALLED TO CANCEL PT'S F/U APPT W/ ROMAIN BLACKMON ON 1/29/25. SHE STATES THEY HAVE CONFLICTING APPTS AND WILL CALL BACK AT A LATER TIME/DATE TO RESCHEDULE.    SENDING ENCOUNTER TO MAKE OFFICE AWARE THAT STROKE-RELATED APPT HAS BEEN CANCELLED WITH OPT TO NOT RESCHEDULE AT THE TIME OF CALL.

## 2025-06-02 ENCOUNTER — HOSPITAL ENCOUNTER (OUTPATIENT)
Dept: RADIATION ONCOLOGY | Facility: HOSPITAL | Age: 80
Setting detail: RADIATION/ONCOLOGY SERIES
Discharge: HOME | End: 2025-06-02
Payer: MEDICARE

## 2025-06-02 ENCOUNTER — OFFICE VISIT (OUTPATIENT)
Dept: RADIATION ONCOLOGY | Facility: HOSPITAL | Age: 80
End: 2025-06-02
Payer: MEDICARE

## 2025-06-02 VITALS
DIASTOLIC BLOOD PRESSURE: 79 MMHG | RESPIRATION RATE: 16 BRPM | WEIGHT: 205.6 LBS | OXYGEN SATURATION: 96 % | BODY MASS INDEX: 28.28 KG/M2 | TEMPERATURE: 97.2 F | SYSTOLIC BLOOD PRESSURE: 133 MMHG | HEART RATE: 68 BPM

## 2025-06-02 DIAGNOSIS — C61 PROSTATE CANCER: Primary | ICD-10-CM

## 2025-06-02 PROCEDURE — G0463 HOSPITAL OUTPT CLINIC VISIT: HCPCS

## 2025-06-02 NOTE — PROGRESS NOTES
FOLLOW UP NOTE    PATIENT:                                                      Lizandro Culp  MEDICAL RECORD #:                        [id number]  :                                                          1945  COMPLETION DATE:   10/23/2023  DIAGNOSIS:     Prostate cancer  - Stage IIB (cT2c, cN0, cM0, PSA: 6, Grade Group: 2)      BRIEF HISTORY:    Routine follow-up visit for low intermediate risk prostate cancer.  He underwent definitive treatment with a course of CyberKnife stereotactic body radiotherapy, completing 10/20/2023.  He tolerated treatment well.  He continues to note stable, moderate BPH outflow obstructive symptoms that are unchanged from prior to treatment.  He endorses an IPSS score of 21, noting urinary intermittency as his predominant symptom followed by weak stream and occasional straining.  He denies dysuria, gross hematuria, or urinary incontinence.  He was unable to tolerate alpha-blocker due to hypotensive symptoms, and has opted for homeopathic treatment with an herbal tea that his wife brews.  He reports some improvement since drinking this daily.  He prefers to avoid medication, if possible.  He reports bowel function is normal.  He denies acute GI complaints.  He continues with chronic ED but is not interested in ED medication at this time.  He denies unexplained weight loss or new/progressive bony pain.  His PSA post-treatment decreased to a value of 1.8 ng/mL on 2024 and has declined in the meantime with most recent value of 0.63 ng/mL on 2025.        IPSS Questionnaire (AUA-7):  Over the past month…    1)  Incomplete Emptying  How often have you had a sensation of not emptying your bladder?  3 - About half the time   2)  Frequency  How often have you had to urinate less than every two hours? 3 - About half the time   3)  Intermittency  How often have you found you stopped and started again several times when you urinated?  5 - Almost always   4) Urgency  How  often have you found it difficult to postpone urination?  0 - Not at all   5) Weak Stream  How often have you had a weak urinary stream?  4 - More than half the time   6) Straining  How often have you had to push or strain to begin urination?  4 - More than half the time   7) Nocturia  How many times did you typically get up at night to urinate?  2 - 2 times   Total Score:  21       Quality of life due to urinary symptoms:  If you were to spend the rest of your life with your urinary condition the way it is now, how would you feel about that? 2-Mostly Satisfied   Urine Leakage (Incontinence) 1-Mild (A few drops a day, no pad use)     Sexual Health Inventory  Current Status    1)  How do you rate your confidence that you could achieve and keep an erection? 1-Very Low   2) When you had erections with sexual stimulation, how often were your erections hard enough for penetration (entering your partner)? 0-No sexual activity   3)  During sexual intercourse, how often were you able to maintain your erection after you had penetrated (entered) into your partner? 0-Did not attempt intercourse   4) During sexual intercourse, how difficult was it to maintain your erection to completion of intercourse? 0-Did not attempt intercourse   5) When you attempted sexual intercourse, how often was it satisfactory to you? 0-No sexual activity   Total Score: 1       Bowel Health Inventory  Current Status: 0-No problems, no rectal bleeding, no discharge, less then 5 bowel movements a day           MEDICATIONS: Medication reconciliation for the patient was reviewed and confirmed in the electronic medical record.    Review of Systems   Constitutional:  Positive for fatigue.   HENT:   Positive for hearing loss.    Genitourinary:  Positive for difficulty urinating (difficulty starting stream on occasion, intermittency with flow), frequency and nocturia.    All other systems reviewed and are negative.            Physical Exam  Vitals and  nursing note reviewed.   Constitutional:       General: He is not in acute distress.     Appearance: Normal appearance. He is well-developed.   HENT:      Head: Normocephalic and atraumatic.   Eyes:      Conjunctiva/sclera: Conjunctivae normal.      Pupils: Pupils are equal, round, and reactive to light.   Cardiovascular:      Rate and Rhythm: Normal rate and regular rhythm.   Pulmonary:      Effort: Pulmonary effort is normal. No respiratory distress.      Breath sounds: Normal breath sounds.   Musculoskeletal:         General: Normal range of motion.   Skin:     General: Skin is warm and dry.   Neurological:      Mental Status: He is alert and oriented to person, place, and time.   Psychiatric:         Behavior: Behavior normal.         Thought Content: Thought content normal.         Judgment: Judgment normal.         VITAL SIGNS:   Vitals:    06/02/25 1036   BP: 133/79   Pulse: 68   Resp: 16   Temp: 97.2 °F (36.2 °C)   TempSrc: Temporal   SpO2: 96%   Weight: 93.3 kg (205 lb 9.6 oz)   PainSc: 0-No pain           KPS 90%    LABORATORY:  PSA 2/6/2024 = 1.8 ng/mL  PSA 6/11/2024 = 0.96 ng/mL  PSA 10/15/2024 = 0.97 ng/mL  PSA 4/22/20205 = 0.63 ng/mL    The following portions of the patient's history were reviewed and updated as appropriate: allergies, current medications, past family history, past medical history, past social history, past surgical history and problem list.         Diagnoses and all orders for this visit:    1. Prostate cancer (Primary)         IMPRESSION:  Prostate cancer, [place]'s 3+4=7, clinical stage IIB (T2c, N0, M0), PSA 6.033 ng/ml.    1-1/2 years status post CyberKnife SBRT.  He tolerated treatment well.  He continues with BPH urinary outflow obstructive symptoms that are reportedly stable and currently managed with the patient's preferred holistic approach.  PSA continues to decline appropriately indicating very good biochemical response to treatment with PSA christianne of 0.63 ng/mL.  The PSA  will likely decline a bit more, with target christianne value <0.5 ng/mL.  I suspect prognosis should remain excellent.  We again reviewed follow-up intervals, biannual PSA monitoring, and expectations for response to treatment.    RECOMMENDATIONS:   Continue biannual PSA monitoring.  Return to clinic in 1 year or sooner as needed, such as 2 consecutive rises in PSA exceeding 2 points above christianne.      Return in about 1 year (around 6/2/2026) for Office Visit.    ROMAIN Calero      I spent a total of 35 minutes on today's visit, with more than 20 minutes in direct face to face communication, and the remainder of the time spent in reviewing the relevant history, records, available imaging, and for documentation.

## 2025-06-04 ENCOUNTER — OFFICE VISIT (OUTPATIENT)
Dept: CARDIOLOGY | Facility: CLINIC | Age: 80
End: 2025-06-04
Payer: MEDICARE

## 2025-06-04 VITALS
WEIGHT: 203 LBS | DIASTOLIC BLOOD PRESSURE: 64 MMHG | HEIGHT: 72 IN | SYSTOLIC BLOOD PRESSURE: 110 MMHG | BODY MASS INDEX: 27.5 KG/M2 | OXYGEN SATURATION: 96 % | HEART RATE: 66 BPM

## 2025-06-04 DIAGNOSIS — I25.810 CORONARY ARTERY DISEASE INVOLVING CORONARY BYPASS GRAFT OF NATIVE HEART WITHOUT ANGINA PECTORIS: ICD-10-CM

## 2025-06-04 DIAGNOSIS — R55 POSTURAL DIZZINESS WITH PRESYNCOPE: Primary | ICD-10-CM

## 2025-06-04 DIAGNOSIS — R42 POSTURAL DIZZINESS WITH PRESYNCOPE: Primary | ICD-10-CM

## 2025-06-04 DIAGNOSIS — I49.5 SSS (SICK SINUS SYNDROME): ICD-10-CM

## 2025-06-04 RX ORDER — BERBERINE CHLOR/SEAWEED/CHROM 500-250 MG
CAPSULE ORAL
COMMUNITY

## 2025-06-04 RX ORDER — NITROGLYCERIN 0.4 MG/1
0.4 TABLET SUBLINGUAL
Qty: 25 TABLET | Refills: 1 | Status: SHIPPED | OUTPATIENT
Start: 2025-06-04

## 2025-06-04 NOTE — PROGRESS NOTES
[facility] Cardiology  Office visit  Lizandro Culp  1945  [phone]  There is no work phone number on file.    VISIT DATE:  6/4/2025    PCP: Dorcas Figueroa MD  [address] [facility] [address]    CC:  Chief Complaint   Patient presents with    Coronary Artery Disease       Previous cardiac studies and procedures:  October 3 2002: Cardiac catheterization: RCA PCI/stenting  October 4, 2002 LAD stent.  October 15 2002 left circumflex stent     March 2017 LAD stent, left circumflex stent, RCA stent.     April 2017: Stent RCA.     August 2019   Saint Thong dual-chamber pacemaker  Myocardial perfusion imaging: EF 55%, mild inferolateral ischemia.     July 2020 TTE: EF 60%, mild diastolic dysfunction, mitral valve prolapse, posterior leaflet, mild MR.    June 2023 LHC    95% mid LAD Denovo stenosis treated 3.5 x 18 Xience EES    70% proximal LAD ISR treated 4.0 x 15 Xience EES.    OCT showed MLA greater than 8 mm² throughout both stents.    Diffuse 50% ISR mid left circumflex    Widely patent RCA stent    Normal LVEF    September 2024 TTE    Left ventricular systolic function is normal. Calculated left ventricular EF = 59.3% Left ventricular ejection fraction appears to be 56 - 60%. Left ventricular diastolic function was normal.    Normal right ventricular size and function.    Mild mitral valve regurgitation is present.    Significant calcification and sclerosis of the aortic valve without stenosis.    Saline test results are negative for right to left atrial level shunt.     ASSESSMENT:   Diagnosis Plan   1. Postural dizziness with presyncope  Duplex Carotid Ultrasound CAR      2. Coronary artery disease involving coronary bypass graft of native heart without angina pectoris        3. SSS (sick sinus syndrome)                Device interrogation:  Saint Thong dual-chamber pacemaker  Gradually downtrending LV lead impedance, see scanned sheet.  No significant  "arrhythmia.    PLAN:  Coronary artery disease: Currently stable and asymptomatic.  Continue aspirin.  Statin intolerant.  Previously has experienced hypotension on clopidogrel, if he has recurrent episodes will switch either to Effient or Brilinta     Sick sinus syndrome: Continue routine follow-up in device clinic.     Hyperlipidemia: Goal LDL less than 100.  Intolerant to statins and Zetia.  Does not want to try alternatives at this time.  Would prefer continuing radiation right supplementation.    Hypotension: Discussed abortive maneuvers.  Avoid dehydration.  Will consider addition of as needed midodrine if symptoms worsen.    Subjective  Interval assessment:   Blood pressures running less than 130/80 mmHg.  Denies chest pain or dyspnea.  Compliant with medical therapy.  Intermittent orthostasis.      Initial evaluation: 76-year-old gentleman with a history of coronary artery disease requiring recurrent multivessel PCI.  Has been stable recently.  Has stable shortness of breath in a class II pattern.  Denies chest discomfort.  Blood pressures running less than 130/80 mmHg.  Has not required any sublingual nitroglycerin recently.  Taking aspirin on a regular basis.  Developed flulike symptoms on multiple statins agents.  Discussed potential options such as PCSK9 inhibitors, he is not interested in trying an alternative agent at this time.  Reviewed most recent laboratory evaluation.    PHYSICAL EXAMINATION:  Vitals:    06/04/25 1041   BP: 110/64   BP Location: Left arm   Patient Position: Sitting   Pulse: 66   SpO2: 96%   Weight: 92.1 kg (203 lb)   Height: 182.9 cm (72\")       General Appearance:    Alert, cooperative, no distress, appears stated age   Head:    Normocephalic, without obvious abnormality, atraumatic   Eyes:    conjunctiva/corneas clear   Nose:   Nares normal, septum midline, mucosa normal, no drainage   Throat:   Lips, teeth and gums normal   Neck:   Supple, symmetrical, trachea midline, no " carotid    bruit or JVD   Lungs:     Clear to auscultation bilaterally, respirations unlabored   Chest Wall:    No tenderness or deformity    Heart:    Regular rate and rhythm, S1 and S2 normal, no murmur, rub   or gallop, normal carotid impulse bilaterally without bruit.   Abdomen:     Soft, non-tender   Extremities:   Extremities normal, atraumatic, no cyanosis or edema   Pulses:   2+ and symmetric all extremities   Skin:   Skin color, texture, turgor normal, no rashes or lesions       Diagnostic Data:  Procedures  Lab Results   Component Value Date    TRIG 67 09/03/2024    HDL 55 09/03/2024     Lab Results   Component Value Date    GLUCOSE 103 (H) 09/03/2024    BUN 13 09/03/2024    CREATININE 0.82 09/03/2024     09/03/2024    K 4.1 09/03/2024     09/03/2024    CO2 21.4 (L) 09/03/2024     Lab Results   Component Value Date    HGBA1C 5.60 09/03/2024     Lab Results   Component Value Date    WBC 4.77 09/03/2024    HGB 14.2 09/03/2024    HCT 41.8 09/03/2024     09/03/2024       Allergies  Allergies   Allergen Reactions    Lipitor [Atorvastatin] Provider Review Needed     Flu like symptoms    Oatmeal Other (See Comments)     Foggy feeling, swollen glands, myalgia       Current Medications    Current Outpatient Medications:     Ascorbic Acid (VITAMIN C PO), Take 1,000 mg by mouth Daily., Disp: , Rfl:     aspirin 325 MG EC tablet, Take 1 tablet by mouth Daily., Disp: , Rfl:     DIGESTIVE ENZYMES PO, Take 1 tablet by mouth 2 (Two) Times a Day., Disp: , Rfl:     KRILL OIL PO, Take 2 tablets by mouth Daily., Disp: , Rfl:     midodrine (PROAMATINE) 2.5 MG tablet, Take 1 tablet by mouth 2 (Two) Times a Day. Take only if BP <100/60  Take one tab at 0900, if needed and at 1500 if needed., Disp: 60 tablet, Rfl: 1    nitroglycerin (NITROSTAT) 0.4 MG SL tablet, Place 1 tablet under the tongue Every 5 (Five) Minutes As Needed for Chest Pain. do not exceed a total of 3 doses in 15 minutes, Disp: 25 tablet, Rfl:  1    Probiotic Product (Probiotic Daily) capsule, Take 1 tablet by mouth Daily., Disp: , Rfl:     Red Yeast Rice 55 MG capsule, Take  by mouth., Disp: , Rfl:     Saw Palmetto, Serenoa repens, (SAW PALMETTO PO), Take 1 tablet by mouth Daily., Disp: , Rfl:           ROS  ROS      SOCIAL HX  Social History     Socioeconomic History    Marital status:    Tobacco Use    Smoking status: Former     Current packs/day: 0.00     Types: Cigarettes     Start date:      Quit date:      Years since quittin.4     Passive exposure: Past    Smokeless tobacco: Never   Vaping Use    Vaping status: Never Used   Substance and Sexual Activity    Alcohol use: Yes     Alcohol/week: 1.0 standard drink of alcohol     Types: 1 Glasses of wine per week     Comment: one drink per day    Drug use: Never    Sexual activity: Defer       FAMILY HX  Family History   Problem Relation Age of Onset    Stroke Mother     Parkinsonism Father     Prostate cancer Brother     Stroke Other     Colon cancer Neg Hx              Ren Sneed III, MD, FACC

## 2025-07-01 ENCOUNTER — HOSPITAL ENCOUNTER (OUTPATIENT)
Dept: CARDIOLOGY | Facility: HOSPITAL | Age: 80
Discharge: HOME | End: 2025-07-01
Admitting: INTERNAL MEDICINE
Payer: MEDICARE

## 2025-07-01 VITALS
SYSTOLIC BLOOD PRESSURE: 127 MMHG | WEIGHT: 203 LBS | BODY MASS INDEX: 27.5 KG/M2 | HEIGHT: 72 IN | DIASTOLIC BLOOD PRESSURE: 88 MMHG

## 2025-07-01 DIAGNOSIS — R42 POSTURAL DIZZINESS WITH PRESYNCOPE: ICD-10-CM

## 2025-07-01 DIAGNOSIS — R55 POSTURAL DIZZINESS WITH PRESYNCOPE: ICD-10-CM

## 2025-07-01 LAB
BH CV XLRA MEAS LEFT DIST CCA EDV: 24 CM/SEC
BH CV XLRA MEAS LEFT DIST CCA PSV: 69.8 CM/SEC
BH CV XLRA MEAS LEFT DIST ICA EDV: 26.9 CM/SEC
BH CV XLRA MEAS LEFT DIST ICA PSV: 66.6 CM/SEC
BH CV XLRA MEAS LEFT ICA/CCA RATIO: 0.67
BH CV XLRA MEAS LEFT MID CCA EDV: 24.8 CM/SEC
BH CV XLRA MEAS LEFT MID CCA PSV: 70.5 CM/SEC
BH CV XLRA MEAS LEFT MID ICA EDV: 32.9 CM/SEC
BH CV XLRA MEAS LEFT MID ICA PSV: 75.7 CM/SEC
BH CV XLRA MEAS LEFT PROX CCA EDV: 26.2 CM/SEC
BH CV XLRA MEAS LEFT PROX CCA PSV: 113.1 CM/SEC
BH CV XLRA MEAS LEFT PROX ECA EDV: 22.2 CM/SEC
BH CV XLRA MEAS LEFT PROX ECA PSV: 89.9 CM/SEC
BH CV XLRA MEAS LEFT PROX ICA EDV: 16.4 CM/SEC
BH CV XLRA MEAS LEFT PROX ICA PSV: 62.3 CM/SEC
BH CV XLRA MEAS LEFT PROX SCLA PSV: 166.4 CM/SEC
BH CV XLRA MEAS LEFT VERTEBRAL A EDV: 21.7 CM/SEC
BH CV XLRA MEAS LEFT VERTEBRAL A PSV: 50.3 CM/SEC
BH CV XLRA MEAS RIGHT DIST CCA EDV: 25.4 CM/SEC
BH CV XLRA MEAS RIGHT DIST CCA PSV: 76.6 CM/SEC
BH CV XLRA MEAS RIGHT DIST ICA EDV: 20.9 CM/SEC
BH CV XLRA MEAS RIGHT DIST ICA PSV: 49.2 CM/SEC
BH CV XLRA MEAS RIGHT ICA/CCA RATIO: 0.96
BH CV XLRA MEAS RIGHT MID CCA EDV: 27.8 CM/SEC
BH CV XLRA MEAS RIGHT MID CCA PSV: 85.3 CM/SEC
BH CV XLRA MEAS RIGHT MID ICA EDV: 29.8 CM/SEC
BH CV XLRA MEAS RIGHT MID ICA PSV: 82.1 CM/SEC
BH CV XLRA MEAS RIGHT PROX CCA EDV: 16.3 CM/SEC
BH CV XLRA MEAS RIGHT PROX CCA PSV: 62.3 CM/SEC
BH CV XLRA MEAS RIGHT PROX ECA EDV: 14.3 CM/SEC
BH CV XLRA MEAS RIGHT PROX ECA PSV: 80.5 CM/SEC
BH CV XLRA MEAS RIGHT PROX ICA EDV: 24.6 CM/SEC
BH CV XLRA MEAS RIGHT PROX ICA PSV: 66.3 CM/SEC
BH CV XLRA MEAS RIGHT PROX SCLA PSV: 122.7 CM/SEC
BH CV XLRA MEAS RIGHT VERTEBRAL A EDV: 9.8 CM/SEC
BH CV XLRA MEAS RIGHT VERTEBRAL A PSV: 35.5 CM/SEC
LEFT ARM BP: NORMAL MMHG
RIGHT ARM BP: NORMAL MMHG

## 2025-07-01 PROCEDURE — 93880 EXTRACRANIAL BILAT STUDY: CPT | Performed by: INTERNAL MEDICINE

## 2025-07-01 PROCEDURE — 93880 EXTRACRANIAL BILAT STUDY: CPT

## 2025-07-02 ENCOUNTER — RESULTS FOLLOW-UP (OUTPATIENT)
Dept: CARDIOLOGY | Facility: CLINIC | Age: 80
End: 2025-07-02
Payer: MEDICARE

## (undated) DEVICE — HI-TORQUE VERSATURN F GUIDE WIRE FULLY COATED .014 STRAIGHT TIP 190 CM: Brand: HI-TORQUE VERSATURN

## (undated) DEVICE — DEV COMPR RADL PRELUDESYNCEZ 30ML 32CM

## (undated) DEVICE — MODEL AT P65, P/N 701554-001KIT CONTENTS: HAND CONTROLLER, 3-WAY HIGH-PRESSURE STOPCOCK WITH ROTATING END AND PREMIUM HIGH-PRESSURE TUBING: Brand: ANGIOTOUCH® KIT

## (undated) DEVICE — GLIDESHEATH BASIC HYDROPHILIC COATED INTRODUCER SHEATH: Brand: GLIDESHEATH

## (undated) DEVICE — KT CATH IMG DRAGONFLY/OPSTAR 2.7F 135CM

## (undated) DEVICE — NC TREK NEO™ CORONARY DILATATION CATHETER 3.50 MM X 8 MM / RAPID-EXCHANGE: Brand: NC TREK NEO™

## (undated) DEVICE — CATH DIAG EXPO M/ PK 6FR FL4/FR4 PIG 3PK

## (undated) DEVICE — NC TREK NEO™ CORONARY DILATATION CATHETER 4.00 MM X 12 MM / RAPID-EXCHANGE: Brand: NC TREK NEO™

## (undated) DEVICE — CATH DIAG EXPO .056 FL3.5 6F 100CM

## (undated) DEVICE — GW PERIPH GUIDERIGHT STD/EXCHNG/J/TIP SS 0.035IN 5X260CM

## (undated) DEVICE — TREK CORONARY DILATATION CATHETER 3.0 MM X 15 MM / RAPID-EXCHANGE: Brand: TREK

## (undated) DEVICE — PK CATH CARD 10

## (undated) DEVICE — DEV INFL MONARCH 25W

## (undated) DEVICE — MODEL BT2000 P/N 700287-012KIT CONTENTS: MANIFOLD WITH SALINE AND CONTRAST PORTS, SALINE TUBING WITH SPIKE AND HAND SYRINGE, TRANSDUCER: Brand: BT2000 AUTOMATED MANIFOLD KIT

## (undated) DEVICE — GUIDE CATHETER: Brand: MACH1™